# Patient Record
Sex: FEMALE | Race: WHITE | NOT HISPANIC OR LATINO | Employment: OTHER | URBAN - METROPOLITAN AREA
[De-identification: names, ages, dates, MRNs, and addresses within clinical notes are randomized per-mention and may not be internally consistent; named-entity substitution may affect disease eponyms.]

---

## 2017-07-22 ENCOUNTER — HOSPITAL ENCOUNTER (EMERGENCY)
Facility: HOSPITAL | Age: 27
Discharge: HOME | End: 2017-07-23
Attending: EMERGENCY MEDICINE | Admitting: EMERGENCY MEDICINE

## 2017-07-22 DIAGNOSIS — K59.00 CONSTIPATION, UNSPECIFIED CONSTIPATION TYPE: ICD-10-CM

## 2017-07-22 DIAGNOSIS — R10.30 LOWER ABDOMINAL PAIN: Primary | ICD-10-CM

## 2017-07-22 PROCEDURE — 99284 EMERGENCY DEPT VISIT MOD MDM: CPT

## 2017-07-22 RX ORDER — ACETAMINOPHEN AND CODEINE PHOSPHATE 120; 12 MG/5ML; MG/5ML
1 SOLUTION ORAL DAILY
COMMUNITY
End: 2022-09-26

## 2017-07-22 RX ORDER — PROCHLORPERAZINE MALEATE 10 MG
10 TABLET ORAL AS NEEDED
COMMUNITY
End: 2022-09-26

## 2017-07-22 RX ORDER — SODIUM CHLORIDE 0.9 % (FLUSH) 0.9 %
10 SYRINGE (ML) INJECTION AS NEEDED
Status: DISCONTINUED | OUTPATIENT
Start: 2017-07-22 | End: 2017-07-23 | Stop reason: HOSPADM

## 2017-07-22 RX ORDER — METOPROLOL TARTRATE 50 MG/1
50 TABLET, FILM COATED ORAL 2 TIMES DAILY
COMMUNITY

## 2017-07-22 RX ORDER — ONDANSETRON 2 MG/ML
4 INJECTION INTRAMUSCULAR; INTRAVENOUS ONCE
Status: COMPLETED | OUTPATIENT
Start: 2017-07-22 | End: 2017-07-23

## 2017-07-22 RX ORDER — DIVALPROEX SODIUM 250 MG/1
500 TABLET, DELAYED RELEASE ORAL NIGHTLY
COMMUNITY
End: 2022-09-26

## 2017-07-22 RX ORDER — TRAZODONE HYDROCHLORIDE 50 MG/1
50 TABLET ORAL NIGHTLY
COMMUNITY
End: 2019-01-23

## 2017-07-22 RX ORDER — CLONAZEPAM 1 MG/1
2 TABLET ORAL 3 TIMES DAILY PRN
COMMUNITY
End: 2022-09-26

## 2017-07-22 RX ORDER — TOPIRAMATE 50 MG/1
50 TABLET, FILM COATED ORAL 2 TIMES DAILY
COMMUNITY

## 2017-07-22 RX ORDER — DULOXETIN HYDROCHLORIDE 60 MG/1
120 CAPSULE, DELAYED RELEASE ORAL DAILY
COMMUNITY
End: 2019-01-23

## 2017-07-22 RX ORDER — MORPHINE SULFATE 4 MG/ML
4 INJECTION, SOLUTION INTRAMUSCULAR; INTRAVENOUS ONCE
Status: COMPLETED | OUTPATIENT
Start: 2017-07-22 | End: 2017-07-23

## 2017-07-22 RX ORDER — GABAPENTIN 600 MG/1
600 TABLET ORAL 2 TIMES DAILY
COMMUNITY
End: 2019-02-26

## 2017-07-22 RX ORDER — CYCLOBENZAPRINE HCL 10 MG
10 TABLET ORAL NIGHTLY
COMMUNITY
End: 2019-01-23

## 2017-07-22 RX ORDER — TIZANIDINE 4 MG/1
4 TABLET ORAL EVERY 8 HOURS PRN
COMMUNITY
End: 2019-01-23

## 2017-07-22 RX ORDER — MELOXICAM 15 MG/1
15 TABLET ORAL DAILY
COMMUNITY
End: 2022-09-26 | Stop reason: ALTCHOICE

## 2017-07-23 ENCOUNTER — APPOINTMENT (OUTPATIENT)
Dept: CT IMAGING | Facility: HOSPITAL | Age: 27
End: 2017-07-23

## 2017-07-23 VITALS
SYSTOLIC BLOOD PRESSURE: 102 MMHG | HEIGHT: 70 IN | BODY MASS INDEX: 30.06 KG/M2 | RESPIRATION RATE: 18 BRPM | WEIGHT: 210 LBS | DIASTOLIC BLOOD PRESSURE: 64 MMHG | HEART RATE: 66 BPM | OXYGEN SATURATION: 98 % | TEMPERATURE: 98.3 F

## 2017-07-23 LAB
ALBUMIN SERPL-MCNC: 4.2 G/DL (ref 3.2–4.8)
ALBUMIN/GLOB SERPL: 1.9 G/DL (ref 1.5–2.5)
ALP SERPL-CCNC: 52 U/L (ref 25–100)
ALT SERPL W P-5'-P-CCNC: 13 U/L (ref 7–40)
AMPHET+METHAMPHET UR QL: NEGATIVE
AMPHETAMINES UR QL: NEGATIVE
ANION GAP SERPL CALCULATED.3IONS-SCNC: 5 MMOL/L (ref 3–11)
AST SERPL-CCNC: 18 U/L (ref 0–33)
B-HCG UR QL: NEGATIVE
B-OH-BUTYR SERPL-SCNC: <0.18 MMOL/L
BARBITURATES UR QL SCN: NEGATIVE
BASOPHILS # BLD AUTO: 0.05 10*3/MM3 (ref 0–0.2)
BASOPHILS NFR BLD AUTO: 0.7 % (ref 0–1)
BENZODIAZ UR QL SCN: NEGATIVE
BILIRUB SERPL-MCNC: 0.1 MG/DL (ref 0.3–1.2)
BILIRUB UR QL STRIP: NEGATIVE
BUN BLD-MCNC: 12 MG/DL (ref 9–23)
BUN/CREAT SERPL: 13.3 (ref 7–25)
BUPRENORPHINE SERPL-MCNC: NEGATIVE NG/ML
CALCIUM SPEC-SCNC: 9.4 MG/DL (ref 8.7–10.4)
CANNABINOIDS SERPL QL: NEGATIVE
CHLORIDE SERPL-SCNC: 106 MMOL/L (ref 99–109)
CLARITY UR: CLEAR
CLUE CELLS SPEC QL WET PREP: ABNORMAL
CO2 SERPL-SCNC: 26 MMOL/L (ref 20–31)
COCAINE UR QL: NEGATIVE
COLOR UR: YELLOW
CREAT BLD-MCNC: 0.9 MG/DL (ref 0.6–1.3)
DEPRECATED RDW RBC AUTO: 46.4 FL (ref 37–54)
EOSINOPHIL # BLD AUTO: 0.46 10*3/MM3 (ref 0–0.3)
EOSINOPHIL NFR BLD AUTO: 6.1 % (ref 0–3)
ERYTHROCYTE [DISTWIDTH] IN BLOOD BY AUTOMATED COUNT: 13.3 % (ref 11.3–14.5)
GFR SERPL CREATININE-BSD FRML MDRD: 75 ML/MIN/1.73
GLOBULIN UR ELPH-MCNC: 2.2 GM/DL
GLUCOSE BLD-MCNC: 200 MG/DL (ref 70–100)
GLUCOSE UR STRIP-MCNC: ABNORMAL MG/DL
HCT VFR BLD AUTO: 41 % (ref 34.5–44)
HGB BLD-MCNC: 12.6 G/DL (ref 11.5–15.5)
HGB UR QL STRIP.AUTO: NEGATIVE
HYDATID CYST SPEC WET PREP: ABNORMAL
IMM GRANULOCYTES # BLD: 0.02 10*3/MM3 (ref 0–0.03)
IMM GRANULOCYTES NFR BLD: 0.3 % (ref 0–0.6)
KETONES UR QL STRIP: NEGATIVE
KOH PREP NAIL: NORMAL
LEUKOCYTE ESTERASE UR QL STRIP.AUTO: NEGATIVE
LIPASE SERPL-CCNC: 31 U/L (ref 6–51)
LYMPHOCYTES # BLD AUTO: 2.83 10*3/MM3 (ref 0.6–4.8)
LYMPHOCYTES NFR BLD AUTO: 37.3 % (ref 24–44)
MCH RBC QN AUTO: 29 PG (ref 27–31)
MCHC RBC AUTO-ENTMCNC: 30.7 G/DL (ref 32–36)
MCV RBC AUTO: 94.5 FL (ref 80–99)
METHADONE UR QL SCN: NEGATIVE
MONOCYTES # BLD AUTO: 0.51 10*3/MM3 (ref 0–1)
MONOCYTES NFR BLD AUTO: 6.7 % (ref 0–12)
NEUTROPHILS # BLD AUTO: 3.72 10*3/MM3 (ref 1.5–8.3)
NEUTROPHILS NFR BLD AUTO: 48.9 % (ref 41–71)
NITRITE UR QL STRIP: NEGATIVE
OPIATES UR QL: NEGATIVE
OXYCODONE UR QL SCN: NEGATIVE
PCP UR QL SCN: NEGATIVE
PH UR STRIP.AUTO: 6.5 [PH] (ref 5–8)
PLATELET # BLD AUTO: 165 10*3/MM3 (ref 150–450)
PMV BLD AUTO: 11.6 FL (ref 6–12)
POTASSIUM BLD-SCNC: 4.2 MMOL/L (ref 3.5–5.5)
PROPOXYPH UR QL: NEGATIVE
PROT SERPL-MCNC: 6.4 G/DL (ref 5.7–8.2)
PROT UR QL STRIP: NEGATIVE
RBC # BLD AUTO: 4.34 10*6/MM3 (ref 3.89–5.14)
SODIUM BLD-SCNC: 137 MMOL/L (ref 132–146)
SP GR UR STRIP: 1.01 (ref 1–1.03)
T VAGINALIS SPEC QL WET PREP: ABNORMAL
TRICYCLICS UR QL SCN: NEGATIVE
UROBILINOGEN UR QL STRIP: ABNORMAL
WBC NRBC COR # BLD: 7.59 10*3/MM3 (ref 3.5–10.8)
WBC SPEC QL WET PREP: ABNORMAL
YEAST GENITAL QL WET PREP: ABNORMAL

## 2017-07-23 PROCEDURE — 81003 URINALYSIS AUTO W/O SCOPE: CPT | Performed by: NURSE PRACTITIONER

## 2017-07-23 PROCEDURE — 96375 TX/PRO/DX INJ NEW DRUG ADDON: CPT

## 2017-07-23 PROCEDURE — 0 IOPAMIDOL 61 % SOLUTION: Performed by: EMERGENCY MEDICINE

## 2017-07-23 PROCEDURE — 80053 COMPREHEN METABOLIC PANEL: CPT | Performed by: NURSE PRACTITIONER

## 2017-07-23 PROCEDURE — 74177 CT ABD & PELVIS W/CONTRAST: CPT

## 2017-07-23 PROCEDURE — 83690 ASSAY OF LIPASE: CPT | Performed by: EMERGENCY MEDICINE

## 2017-07-23 PROCEDURE — 85025 COMPLETE CBC W/AUTO DIFF WBC: CPT | Performed by: NURSE PRACTITIONER

## 2017-07-23 PROCEDURE — 87210 SMEAR WET MOUNT SALINE/INK: CPT | Performed by: NURSE PRACTITIONER

## 2017-07-23 PROCEDURE — 96361 HYDRATE IV INFUSION ADD-ON: CPT

## 2017-07-23 PROCEDURE — 25010000002 DIPHENHYDRAMINE PER 50 MG: Performed by: NURSE PRACTITIONER

## 2017-07-23 PROCEDURE — 80306 DRUG TEST PRSMV INSTRMNT: CPT | Performed by: EMERGENCY MEDICINE

## 2017-07-23 PROCEDURE — 25010000002 KETOROLAC TROMETHAMINE PER 15 MG: Performed by: NURSE PRACTITIONER

## 2017-07-23 PROCEDURE — 96374 THER/PROPH/DIAG INJ IV PUSH: CPT

## 2017-07-23 PROCEDURE — 82010 KETONE BODYS QUAN: CPT | Performed by: NURSE PRACTITIONER

## 2017-07-23 PROCEDURE — 25010000002 MORPHINE PER 10 MG: Performed by: EMERGENCY MEDICINE

## 2017-07-23 PROCEDURE — 87220 TISSUE EXAM FOR FUNGI: CPT | Performed by: NURSE PRACTITIONER

## 2017-07-23 PROCEDURE — 87491 CHLMYD TRACH DNA AMP PROBE: CPT | Performed by: NURSE PRACTITIONER

## 2017-07-23 PROCEDURE — 25010000002 ONDANSETRON PER 1 MG: Performed by: EMERGENCY MEDICINE

## 2017-07-23 PROCEDURE — 87591 N.GONORRHOEAE DNA AMP PROB: CPT | Performed by: NURSE PRACTITIONER

## 2017-07-23 PROCEDURE — 81025 URINE PREGNANCY TEST: CPT | Performed by: NURSE PRACTITIONER

## 2017-07-23 PROCEDURE — 96376 TX/PRO/DX INJ SAME DRUG ADON: CPT

## 2017-07-23 RX ORDER — DIPHENHYDRAMINE HYDROCHLORIDE 50 MG/ML
25 INJECTION INTRAMUSCULAR; INTRAVENOUS ONCE
Status: COMPLETED | OUTPATIENT
Start: 2017-07-23 | End: 2017-07-23

## 2017-07-23 RX ORDER — KETOROLAC TROMETHAMINE 30 MG/ML
30 INJECTION, SOLUTION INTRAMUSCULAR; INTRAVENOUS ONCE
Status: COMPLETED | OUTPATIENT
Start: 2017-07-23 | End: 2017-07-23

## 2017-07-23 RX ORDER — MORPHINE SULFATE 4 MG/ML
4 INJECTION, SOLUTION INTRAMUSCULAR; INTRAVENOUS ONCE
Status: COMPLETED | OUTPATIENT
Start: 2017-07-23 | End: 2017-07-23

## 2017-07-23 RX ORDER — POLYETHYLENE GLYCOL 3350 17 G/17G
17 POWDER, FOR SOLUTION ORAL DAILY PRN
Qty: 225 G | Refills: 0 | Status: SHIPPED | OUTPATIENT
Start: 2017-07-23 | End: 2022-09-26 | Stop reason: ALTCHOICE

## 2017-07-23 RX ADMIN — SODIUM CHLORIDE 1000 ML: 9 INJECTION, SOLUTION INTRAVENOUS at 01:00

## 2017-07-23 RX ADMIN — MORPHINE SULFATE 4 MG: 4 INJECTION, SOLUTION INTRAMUSCULAR; INTRAVENOUS at 02:30

## 2017-07-23 RX ADMIN — MORPHINE SULFATE 4 MG: 4 INJECTION, SOLUTION INTRAMUSCULAR; INTRAVENOUS at 01:01

## 2017-07-23 RX ADMIN — ONDANSETRON 4 MG: 2 INJECTION INTRAMUSCULAR; INTRAVENOUS at 01:00

## 2017-07-23 RX ADMIN — KETOROLAC TROMETHAMINE 30 MG: 30 INJECTION, SOLUTION INTRAMUSCULAR at 03:37

## 2017-07-23 RX ADMIN — IOPAMIDOL 75 ML: 612 INJECTION, SOLUTION INTRAVENOUS at 01:51

## 2017-07-23 RX ADMIN — DIPHENHYDRAMINE HYDROCHLORIDE 25 MG: 50 INJECTION INTRAMUSCULAR; INTRAVENOUS at 03:37

## 2017-07-23 NOTE — ED PROVIDER NOTES
Subjective   HPI Comments: Ms. Marcie Black is a 27 y.o. female who presents to the ED with c/o pelvic pain. She reports that she has been having severe pelvic pain for the last month. She states that around a kaiser ago she began having incontinence every 2-3 nights and shortly after that is when she developed pelvic pain. She notes that the pelvic pain radiates into her lower back and upper thigh. She also complains of nausea, and a decreased appetite. She denies vomiting, diarrhea, and vaginal discharge. She also reports that her last OBGYN recently retired so she is in the process of looking for a new one but saw a PA about her problem who scheduled a CT in 2 days. However, she states that she can't wait until then to have the CT. She also reports that she was told by the PA that she may have ovarian cysts and was started on BC pills but they did not do an ultrasound. She has a hx of diabetes and removal of a large cyst. She states that her period ended 3 days ago and she is not sexually active. No other acute complaints at this time.    Patient is a 27 y.o. female presenting with pain.   History provided by:  Patient  Pain   Location:  Pelvis  Severity:  Severe  Onset quality:  Gradual  Duration:  1 month  Timing:  Constant  Progression:  Unchanged  Chronicity:  New  Associated symptoms: nausea    Associated symptoms: no diarrhea and no vomiting        Review of Systems   Constitutional: Positive for appetite change (Decreased appetite).   Gastrointestinal: Positive for nausea. Negative for diarrhea and vomiting.   Genitourinary: Negative for vaginal discharge.   Musculoskeletal: Positive for back pain.   All other systems reviewed and are negative.      Past Medical History:   Diagnosis Date   • Anxiety    • Depression    • Diabetes mellitus    • Gastroparesis    • Migraine    • PTSD (post-traumatic stress disorder)    • Rapid palpitations        Allergies   Allergen Reactions   • Sulfa Antibiotics Nausea And  Vomiting   • Amoxicillin Rash   • Penicillins Rash       Past Surgical History:   Procedure Laterality Date   • HYMENECTOMY     • OVARIAN CYST REMOVAL         History reviewed. No pertinent family history.    Social History     Social History   • Marital status: Single     Spouse name: N/A   • Number of children: N/A   • Years of education: N/A     Social History Main Topics   • Smoking status: Never Smoker   • Smokeless tobacco: None   • Alcohol use No   • Drug use: No   • Sexual activity: Yes     Other Topics Concern   • None     Social History Narrative   • None         Objective   Physical Exam   Constitutional: She is oriented to person, place, and time. She appears well-developed and well-nourished. She appears distressed (unable to get comfortable).   HENT:   Head: Normocephalic and atraumatic.   Right Ear: External ear normal.   Left Ear: External ear normal.   Eyes: EOM are normal. Pupils are equal, round, and reactive to light.   Neck: Normal range of motion. Neck supple.   Cardiovascular: Normal rate, regular rhythm and normal heart sounds.    Pulmonary/Chest: Effort normal and breath sounds normal. No respiratory distress. She has no wheezes.   Abdominal: Soft. Bowel sounds are normal. She exhibits no distension. There is tenderness (bilateral lower abdominal tenderness).   Genitourinary: There is no rash, tenderness or lesion on the right labia. There is no rash, tenderness or lesion on the left labia. Cervix exhibits discharge (clear discharge). Cervix exhibits no motion tenderness. Right adnexum displays no mass, no tenderness and no fullness. Left adnexum displays no mass, no tenderness and no fullness. There is bleeding (mild bleeding) in the vagina. No erythema or tenderness in the vagina.   Musculoskeletal: Normal range of motion. She exhibits no edema, tenderness or deformity.   Neurological: She is alert and oriented to person, place, and time. No cranial nerve deficit.   Skin: Skin is warm and  dry. She is not diaphoretic. No erythema. No pallor.   Psychiatric: She has a normal mood and affect.   Nursing note and vitals reviewed.      Procedures         ED Course  ED Course   Comment By Time   Pt is advised of results at this time. Pt informs that she has to give herself enemas every 3 days.  Pt reports pain relief. Pt will be dc home. Pt to f/u with pcp as discussed. Pt agrees and verb understanding. Coco Benitez, APRN 07/23 0407       Recent Results (from the past 24 hour(s))   Comprehensive Metabolic Panel    Collection Time: 07/23/17 12:38 AM   Result Value Ref Range    Glucose 200 (H) 70 - 100 mg/dL    BUN 12 9 - 23 mg/dL    Creatinine 0.90 0.60 - 1.30 mg/dL    Sodium 137 132 - 146 mmol/L    Potassium 4.2 3.5 - 5.5 mmol/L    Chloride 106 99 - 109 mmol/L    CO2 26.0 20.0 - 31.0 mmol/L    Calcium 9.4 8.7 - 10.4 mg/dL    Total Protein 6.4 5.7 - 8.2 g/dL    Albumin 4.20 3.20 - 4.80 g/dL    ALT (SGPT) 13 7 - 40 U/L    AST (SGOT) 18 0 - 33 U/L    Alkaline Phosphatase 52 25 - 100 U/L    Total Bilirubin 0.1 (L) 0.3 - 1.2 mg/dL    eGFR Non African Amer 75 >60 mL/min/1.73    Globulin 2.2 gm/dL    A/G Ratio 1.9 1.5 - 2.5 g/dL    BUN/Creatinine Ratio 13.3 7.0 - 25.0    Anion Gap 5.0 3.0 - 11.0 mmol/L   CBC Auto Differential    Collection Time: 07/23/17 12:38 AM   Result Value Ref Range    WBC 7.59 3.50 - 10.80 10*3/mm3    RBC 4.34 3.89 - 5.14 10*6/mm3    Hemoglobin 12.6 11.5 - 15.5 g/dL    Hematocrit 41.0 34.5 - 44.0 %    MCV 94.5 80.0 - 99.0 fL    MCH 29.0 27.0 - 31.0 pg    MCHC 30.7 (L) 32.0 - 36.0 g/dL    RDW 13.3 11.3 - 14.5 %    RDW-SD 46.4 37.0 - 54.0 fl    MPV 11.6 6.0 - 12.0 fL    Platelets 165 150 - 450 10*3/mm3    Neutrophil % 48.9 41.0 - 71.0 %    Lymphocyte % 37.3 24.0 - 44.0 %    Monocyte % 6.7 0.0 - 12.0 %    Eosinophil % 6.1 (H) 0.0 - 3.0 %    Basophil % 0.7 0.0 - 1.0 %    Immature Grans % 0.3 0.0 - 0.6 %    Neutrophils, Absolute 3.72 1.50 - 8.30 10*3/mm3    Lymphocytes, Absolute 2.83 0.60 -  4.80 10*3/mm3    Monocytes, Absolute 0.51 0.00 - 1.00 10*3/mm3    Eosinophils, Absolute 0.46 (H) 0.00 - 0.30 10*3/mm3    Basophils, Absolute 0.05 0.00 - 0.20 10*3/mm3    Immature Grans, Absolute 0.02 0.00 - 0.03 10*3/mm3   Lipase    Collection Time: 07/23/17 12:38 AM   Result Value Ref Range    Lipase 31 6 - 51 U/L   Beta Hydroxybutyrate Quantitative    Collection Time: 07/23/17 12:38 AM   Result Value Ref Range    Beta-Hydroxybutyrate Quant <0.180 <=0.500 mmol/L   Pregnancy, Urine    Collection Time: 07/23/17  1:01 AM   Result Value Ref Range    HCG, Urine QL Negative Negative   Urinalysis With / Culture If Indicated    Collection Time: 07/23/17  1:01 AM   Result Value Ref Range    Color, UA Yellow Yellow, Straw    Appearance, UA Clear Clear    pH, UA 6.5 5.0 - 8.0    Specific Gravity, UA 1.010 1.001 - 1.030    Glucose,  mg/dL (Trace) (A) Negative    Ketones, UA Negative Negative    Bilirubin, UA Negative Negative    Blood, UA Negative Negative    Protein, UA Negative Negative    Leuk Esterase, UA Negative Negative    Nitrite, UA Negative Negative    Urobilinogen, UA 0.2 E.U./dL 0.2 - 1.0 E.U./dL   Urine Drug Screen    Collection Time: 07/23/17  1:01 AM   Result Value Ref Range    THC, Screen, Urine Negative Negative    Phencyclidine (PCP), Urine Negative Negative    Cocaine Screen, Urine Negative Negative    Methamphetamine, Urine Negative Negative    Opiate Screen Negative Negative    Amphetamine Screen, Urine Negative Negative    Benzodiazepine Screen, Urine Negative Negative    Tricyclic Antidepressants Screen Negative Negative    Methadone Screen, Urine Negative Negative    Barbiturates Screen, Urine Negative Negative    Oxycodone Screen, Urine Negative Negative    Propoxyphene Screen Negative Negative    Buprenorphine, Screen, Urine Negative Negative     Note: In addition to lab results from this visit, the labs listed above may include labs taken at another facility or during a different encounter  "within the last 24 hours. Please correlate lab times with ED admission and discharge times for further clarification of the services performed during this visit.    CT Abdomen Pelvis With Contrast   Final Result   Abnormal      1.  Moderate amount of stool throughout the colon, without obstruction.        2.  Incidental/non-acute findings are described above.      THIS DOCUMENT HAS BEEN ELECTRONICALLY SIGNED BY MAHAMED BUCKLEY MD        Vitals:    07/22/17 2308 07/23/17 0230 07/23/17 0315 07/23/17 0316   BP: 110/64 114/76 124/63    BP Location:  Right arm     Patient Position:  Lying     Pulse: 72 65 65    Resp: 18 16 18    Temp: 98.2 °F (36.8 °C)      SpO2: 97% 99%  99%   Weight: 210 lb (95.3 kg)      Height: 70\" (177.8 cm)        Medications   sodium chloride 0.9 % flush 10 mL (not administered)   sodium chloride 0.9 % bolus 1,000 mL (0 mL Intravenous Stopped 7/23/17 0340)   ondansetron (ZOFRAN) injection 4 mg (4 mg Intravenous Given 7/23/17 0100)   Morphine sulfate (PF) injection 4 mg (4 mg Intravenous Given 7/23/17 0101)   iopamidol (ISOVUE-300) 61 % injection 100 mL (75 mL Intravenous Given 7/23/17 0151)   Morphine sulfate (PF) injection 4 mg (4 mg Intravenous Given 7/23/17 0230)   diphenhydrAMINE (BENADRYL) injection 25 mg (25 mg Intravenous Given 7/23/17 0337)   ketorolac (TORADOL) injection 30 mg (30 mg Intravenous Given 7/23/17 0337)     ECG/EMG Results (last 24 hours)     ** No results found for the last 24 hours. **                      MDM    Final diagnoses:   Lower abdominal pain   Constipation, unspecified constipation type       Documentation assistance provided by mita Mathis.  Information recorded by the scribe was done at my direction and has been verified and validated by me.     Venita Mathis  07/22/17 9442       NATHALIA Donnelly  07/23/17 3353    "

## 2017-07-24 LAB
C TRACH RRNA SPEC DONR QL NAA+PROBE: NEGATIVE
N GONORRHOEA DNA SPEC QL NAA+PROBE: NEGATIVE

## 2018-01-14 ENCOUNTER — APPOINTMENT (OUTPATIENT)
Dept: CT IMAGING | Facility: HOSPITAL | Age: 28
End: 2018-01-14

## 2018-01-14 ENCOUNTER — HOSPITAL ENCOUNTER (EMERGENCY)
Facility: HOSPITAL | Age: 28
Discharge: HOME | End: 2018-01-14
Attending: EMERGENCY MEDICINE | Admitting: EMERGENCY MEDICINE

## 2018-01-14 VITALS
BODY MASS INDEX: 29.2 KG/M2 | OXYGEN SATURATION: 99 % | HEIGHT: 70 IN | SYSTOLIC BLOOD PRESSURE: 110 MMHG | RESPIRATION RATE: 16 BRPM | DIASTOLIC BLOOD PRESSURE: 77 MMHG | WEIGHT: 204 LBS | HEART RATE: 67 BPM | TEMPERATURE: 98.1 F

## 2018-01-14 DIAGNOSIS — G43.701 CHRONIC MIGRAINE WITHOUT AURA WITH STATUS MIGRAINOSUS, NOT INTRACTABLE: Primary | ICD-10-CM

## 2018-01-14 LAB
ALBUMIN SERPL-MCNC: 3.9 G/DL (ref 3.2–4.8)
ALBUMIN/GLOB SERPL: 1.8 G/DL (ref 1.5–2.5)
ALP SERPL-CCNC: 51 U/L (ref 25–100)
ALT SERPL W P-5'-P-CCNC: 16 U/L (ref 7–40)
AMPHET+METHAMPHET UR QL: NEGATIVE
AMPHETAMINES UR QL: NEGATIVE
ANION GAP SERPL CALCULATED.3IONS-SCNC: 9 MMOL/L (ref 3–11)
AST SERPL-CCNC: 12 U/L (ref 0–33)
B-HCG UR QL: NEGATIVE
BARBITURATES UR QL SCN: POSITIVE
BASOPHILS # BLD AUTO: 0.07 10*3/MM3 (ref 0–0.2)
BASOPHILS NFR BLD AUTO: 1 % (ref 0–1)
BENZODIAZ UR QL SCN: NEGATIVE
BILIRUB SERPL-MCNC: 0.2 MG/DL (ref 0.3–1.2)
BILIRUB UR QL STRIP: NEGATIVE
BUN BLD-MCNC: 12 MG/DL (ref 9–23)
BUN/CREAT SERPL: 12 (ref 7–25)
BUPRENORPHINE SERPL-MCNC: NEGATIVE NG/ML
CALCIUM SPEC-SCNC: 8.7 MG/DL (ref 8.7–10.4)
CANNABINOIDS SERPL QL: NEGATIVE
CHLORIDE SERPL-SCNC: 109 MMOL/L (ref 99–109)
CLARITY UR: CLEAR
CO2 SERPL-SCNC: 23 MMOL/L (ref 20–31)
COCAINE UR QL: NEGATIVE
COLOR UR: YELLOW
CREAT BLD-MCNC: 1 MG/DL (ref 0.6–1.3)
DEPRECATED RDW RBC AUTO: 49.2 FL (ref 37–54)
EOSINOPHIL # BLD AUTO: 0.36 10*3/MM3 (ref 0–0.3)
EOSINOPHIL NFR BLD AUTO: 5 % (ref 0–3)
ERYTHROCYTE [DISTWIDTH] IN BLOOD BY AUTOMATED COUNT: 13.9 % (ref 11.3–14.5)
ETHANOL BLD-MCNC: <10 MG/DL (ref 0–10)
GFR SERPL CREATININE-BSD FRML MDRD: 67 ML/MIN/1.73
GLOBULIN UR ELPH-MCNC: 2.2 GM/DL
GLUCOSE BLD-MCNC: 113 MG/DL (ref 70–100)
GLUCOSE BLDC GLUCOMTR-MCNC: 112 MG/DL (ref 70–130)
GLUCOSE BLDC GLUCOMTR-MCNC: 55 MG/DL (ref 70–130)
GLUCOSE UR STRIP-MCNC: NEGATIVE MG/DL
HCT VFR BLD AUTO: 40.4 % (ref 34.5–44)
HGB BLD-MCNC: 12.4 G/DL (ref 11.5–15.5)
HGB UR QL STRIP.AUTO: NEGATIVE
IMM GRANULOCYTES # BLD: 0.01 10*3/MM3 (ref 0–0.03)
IMM GRANULOCYTES NFR BLD: 0.1 % (ref 0–0.6)
INTERNAL NEGATIVE CONTROL: REACTIVE
INTERNAL POSITIVE CONTROL: REACTIVE
KETONES UR QL STRIP: NEGATIVE
LEUKOCYTE ESTERASE UR QL STRIP.AUTO: NEGATIVE
LYMPHOCYTES # BLD AUTO: 2.35 10*3/MM3 (ref 0.6–4.8)
LYMPHOCYTES NFR BLD AUTO: 32.8 % (ref 24–44)
Lab: NORMAL
MCH RBC QN AUTO: 29.6 PG (ref 27–31)
MCHC RBC AUTO-ENTMCNC: 30.7 G/DL (ref 32–36)
MCV RBC AUTO: 96.4 FL (ref 80–99)
METHADONE UR QL SCN: NEGATIVE
MONOCYTES # BLD AUTO: 0.54 10*3/MM3 (ref 0–1)
MONOCYTES NFR BLD AUTO: 7.5 % (ref 0–12)
NEUTROPHILS # BLD AUTO: 3.83 10*3/MM3 (ref 1.5–8.3)
NEUTROPHILS NFR BLD AUTO: 53.6 % (ref 41–71)
NITRITE UR QL STRIP: NEGATIVE
OPIATES UR QL: NEGATIVE
OXYCODONE UR QL SCN: NEGATIVE
PCP UR QL SCN: NEGATIVE
PH UR STRIP.AUTO: 7.5 [PH] (ref 5–8)
PLATELET # BLD AUTO: 193 10*3/MM3 (ref 150–450)
PMV BLD AUTO: 12.7 FL (ref 6–12)
POTASSIUM BLD-SCNC: 3.9 MMOL/L (ref 3.5–5.5)
PROPOXYPH UR QL: NEGATIVE
PROT SERPL-MCNC: 6.1 G/DL (ref 5.7–8.2)
PROT UR QL STRIP: NEGATIVE
RBC # BLD AUTO: 4.19 10*6/MM3 (ref 3.89–5.14)
SODIUM BLD-SCNC: 141 MMOL/L (ref 132–146)
SP GR UR STRIP: 1.01 (ref 1–1.03)
TRICYCLICS UR QL SCN: NEGATIVE
UROBILINOGEN UR QL STRIP: NORMAL
VALPROATE SERPL-MCNC: <1 MCG/ML (ref 50–150)
WBC NRBC COR # BLD: 7.16 10*3/MM3 (ref 3.5–10.8)

## 2018-01-14 PROCEDURE — 25010000002 METOCLOPRAMIDE PER 10 MG: Performed by: PHYSICIAN ASSISTANT

## 2018-01-14 PROCEDURE — 81003 URINALYSIS AUTO W/O SCOPE: CPT | Performed by: PHYSICIAN ASSISTANT

## 2018-01-14 PROCEDURE — 99284 EMERGENCY DEPT VISIT MOD MDM: CPT

## 2018-01-14 PROCEDURE — 25010000002 DIPHENHYDRAMINE PER 50 MG: Performed by: PHYSICIAN ASSISTANT

## 2018-01-14 PROCEDURE — 80164 ASSAY DIPROPYLACETIC ACD TOT: CPT | Performed by: PHYSICIAN ASSISTANT

## 2018-01-14 PROCEDURE — 25010000002 KETOROLAC TROMETHAMINE PER 15 MG: Performed by: PHYSICIAN ASSISTANT

## 2018-01-14 PROCEDURE — 96375 TX/PRO/DX INJ NEW DRUG ADDON: CPT

## 2018-01-14 PROCEDURE — 25010000002 HYDROMORPHONE PER 4 MG: Performed by: EMERGENCY MEDICINE

## 2018-01-14 PROCEDURE — 80053 COMPREHEN METABOLIC PANEL: CPT | Performed by: PHYSICIAN ASSISTANT

## 2018-01-14 PROCEDURE — 85025 COMPLETE CBC W/AUTO DIFF WBC: CPT | Performed by: PHYSICIAN ASSISTANT

## 2018-01-14 PROCEDURE — 25010000002 LORAZEPAM PER 2 MG: Performed by: EMERGENCY MEDICINE

## 2018-01-14 PROCEDURE — 96374 THER/PROPH/DIAG INJ IV PUSH: CPT

## 2018-01-14 PROCEDURE — 80306 DRUG TEST PRSMV INSTRMNT: CPT | Performed by: PHYSICIAN ASSISTANT

## 2018-01-14 PROCEDURE — 80307 DRUG TEST PRSMV CHEM ANLYZR: CPT | Performed by: PHYSICIAN ASSISTANT

## 2018-01-14 PROCEDURE — 82962 GLUCOSE BLOOD TEST: CPT

## 2018-01-14 PROCEDURE — 96361 HYDRATE IV INFUSION ADD-ON: CPT

## 2018-01-14 PROCEDURE — 70450 CT HEAD/BRAIN W/O DYE: CPT

## 2018-01-14 PROCEDURE — 25010000002 FENTANYL CITRATE (PF) 100 MCG/2ML SOLUTION: Performed by: EMERGENCY MEDICINE

## 2018-01-14 RX ORDER — SODIUM CHLORIDE 0.9 % (FLUSH) 0.9 %
10 SYRINGE (ML) INJECTION AS NEEDED
Status: DISCONTINUED | OUTPATIENT
Start: 2018-01-14 | End: 2018-01-14 | Stop reason: HOSPADM

## 2018-01-14 RX ORDER — LORAZEPAM 2 MG/ML
1 INJECTION INTRAMUSCULAR ONCE
Status: COMPLETED | OUTPATIENT
Start: 2018-01-14 | End: 2018-01-14

## 2018-01-14 RX ORDER — DIPHENHYDRAMINE HYDROCHLORIDE 50 MG/ML
25 INJECTION INTRAMUSCULAR; INTRAVENOUS ONCE
Status: COMPLETED | OUTPATIENT
Start: 2018-01-14 | End: 2018-01-14

## 2018-01-14 RX ORDER — CETIRIZINE HYDROCHLORIDE 10 MG/1
10 TABLET ORAL DAILY
COMMUNITY
End: 2022-09-26

## 2018-01-14 RX ORDER — FENTANYL CITRATE 50 UG/ML
50 INJECTION, SOLUTION INTRAMUSCULAR; INTRAVENOUS ONCE
Status: COMPLETED | OUTPATIENT
Start: 2018-01-14 | End: 2018-01-14

## 2018-01-14 RX ORDER — METOCLOPRAMIDE HYDROCHLORIDE 5 MG/ML
10 INJECTION INTRAMUSCULAR; INTRAVENOUS ONCE
Status: COMPLETED | OUTPATIENT
Start: 2018-01-14 | End: 2018-01-14

## 2018-01-14 RX ORDER — HYDROMORPHONE HYDROCHLORIDE 1 MG/ML
1 INJECTION, SOLUTION INTRAMUSCULAR; INTRAVENOUS; SUBCUTANEOUS ONCE
Status: COMPLETED | OUTPATIENT
Start: 2018-01-14 | End: 2018-01-14

## 2018-01-14 RX ORDER — KETOROLAC TROMETHAMINE 30 MG/ML
15 INJECTION, SOLUTION INTRAMUSCULAR; INTRAVENOUS ONCE
Status: COMPLETED | OUTPATIENT
Start: 2018-01-14 | End: 2018-01-14

## 2018-01-14 RX ADMIN — LORAZEPAM 1 MG: 2 INJECTION, SOLUTION INTRAMUSCULAR; INTRAVENOUS at 12:38

## 2018-01-14 RX ADMIN — DIPHENHYDRAMINE HYDROCHLORIDE 25 MG: 50 INJECTION INTRAMUSCULAR; INTRAVENOUS at 12:35

## 2018-01-14 RX ADMIN — KETOROLAC TROMETHAMINE 15 MG: 30 INJECTION, SOLUTION INTRAMUSCULAR at 12:37

## 2018-01-14 RX ADMIN — SODIUM CHLORIDE 1000 ML: 9 INJECTION, SOLUTION INTRAVENOUS at 12:40

## 2018-01-14 RX ADMIN — FENTANYL CITRATE 50 MCG: 50 INJECTION INTRAMUSCULAR; INTRAVENOUS at 13:37

## 2018-01-14 RX ADMIN — METOCLOPRAMIDE 10 MG: 5 INJECTION, SOLUTION INTRAMUSCULAR; INTRAVENOUS at 12:40

## 2018-01-14 RX ADMIN — HYDROMORPHONE HYDROCHLORIDE 1 MG: 2 INJECTION INTRAMUSCULAR; INTRAVENOUS; SUBCUTANEOUS at 15:31

## 2018-01-14 NOTE — DISCHARGE INSTRUCTIONS
Continue current meds.  Follow up with your PCP on Tuesday.  Call Dr. Haddad's office to schedule a follow up appointment.

## 2018-01-14 NOTE — ED PROVIDER NOTES
Subjective   HPI Comments: 27-year-old female presents to the emergency department with complaints of incapacitating headaches for the past 2 weeks with associated nausea and vomiting.  The patient states that she has a history of chronic recurring headaches since age 15.  She states that she has been evaluated numerous times over the years for her headaches with no good explanation other than related to PTSD and anxiety.  The patient states that this time her headache is different than usual.  Her pain is behind the eyes and in the frontal region.  She has been having some double vision.  She has had no associated fever.  She states that she vomits 6-8 times per day for the past week.  No diarrhea.  No abdominal pain.  The patient states that she has been having a drink or 2 every night at bedtime to try to help her sleep but she wakes up during the night with recurring headaches, nausea and vomiting.  The patient has a history of type 1 diabetes and states that her blood sugar has been running high.  She also has a history of fibromyalgia.  She is a nonsmoker.  She denies any drug use.  Her PCP is Behzad Martin in [facility].    Patient is a 27 y.o. female presenting with headaches.   History provided by:  Patient  Headache   Pain location:  Frontal  Quality:  Sharp and stabbing  Radiates to:  Does not radiate  Pain severity now: severe.  Pain scale at highest: severe.  Onset quality:  Unable to specify  Duration: chronic but worse in past 2 wks.  Progression:  Waxing and waning  Chronicity:  Recurrent  Similar to prior headaches: no    Relieved by:  Nothing  Worsened by:  Nothing  Ineffective treatments:  Prescription medications  Associated symptoms: fatigue, myalgias (secondary to fibromyalgia), nausea and vomiting    Associated symptoms: no abdominal pain, no back pain, no congestion, no cough, no diarrhea, no dizziness, no ear pain, no eye pain, no facial pain, no fever, no focal weakness, no neck  pain, no neck stiffness, no numbness, no sore throat and no weakness        Review of Systems   Constitutional: Positive for appetite change and fatigue. Negative for chills and fever.   HENT: Negative for congestion, ear pain, nosebleeds, rhinorrhea and sore throat.    Eyes: Positive for visual disturbance (double vision at times). Negative for pain and discharge.   Respiratory: Negative for cough, shortness of breath and wheezing.    Cardiovascular: Negative for chest pain, palpitations and leg swelling.   Gastrointestinal: Positive for nausea and vomiting. Negative for abdominal pain, blood in stool and diarrhea.   Endocrine: Negative.    Genitourinary: Negative for dysuria, hematuria and urgency.   Musculoskeletal: Positive for myalgias (secondary to fibromyalgia). Negative for arthralgias, back pain, neck pain and neck stiffness.   Skin: Negative for pallor and rash.   Allergic/Immunologic: Negative for immunocompromised state.   Neurological: Positive for headaches. Negative for dizziness, focal weakness, speech difficulty, weakness and numbness.   Hematological: Negative for adenopathy. Does not bruise/bleed easily.   Psychiatric/Behavioral: Negative.  Negative for confusion.        Hx of severe anxiety and PTSD       Past Medical History:   Diagnosis Date   • Anxiety    • Depression    • Diabetes mellitus    • Gastroparesis    • Migraine    • PTSD (post-traumatic stress disorder)    • Rapid palpitations        Allergies   Allergen Reactions   • Sulfa Antibiotics Nausea And Vomiting   • Amoxicillin Rash   • Penicillins Rash       Past Surgical History:   Procedure Laterality Date   • HYMENECTOMY     • OVARIAN CYST REMOVAL         History reviewed. No pertinent family history.    Social History     Social History   • Marital status: Single     Spouse name: N/A   • Number of children: N/A   • Years of education: N/A     Social History Main Topics   • Smoking status: Never Smoker   • Smokeless tobacco: None   •  Alcohol use No   • Drug use: No   • Sexual activity: Yes     Other Topics Concern   • None     Social History Narrative           Objective   Physical Exam   Constitutional: She is oriented to person, place, and time. She appears well-developed and well-nourished.   Anxious, writhing around, appears uncomfortable     HENT:   Head: Normocephalic and atraumatic.   Right Ear: External ear normal.   Left Ear: External ear normal.   Nose: Nose normal.   Mouth/Throat: Oropharynx is clear and moist.   No sinus tenderness     Eyes: Conjunctivae and EOM are normal. Pupils are equal, round, and reactive to light. Left eye exhibits no discharge. No scleral icterus.   Neck: Normal range of motion. Neck supple.   No meningeal signs     Cardiovascular: Normal rate, regular rhythm, normal heart sounds and intact distal pulses.    No murmur heard.  Pulmonary/Chest: Effort normal and breath sounds normal. No respiratory distress. She has no wheezes. She has no rales. She exhibits no tenderness.   Abdominal: Soft. Bowel sounds are normal. There is no tenderness.   Musculoskeletal: Normal range of motion. She exhibits no edema or tenderness.   Neurological: She is alert and oriented to person, place, and time.   Skin: Skin is warm and dry. No rash noted. She is not diaphoretic.   Psychiatric:   Anxious     Nursing note and vitals reviewed.      Procedures         ED Course  ED Course      4:11 PM  Pt finally better after Dilaudid.  She had no improvement from the migraine cocktail earlier.  Nml CT head.  No concerning labs.  Will d/c home to f/u.  She states she has no neurologist and hasn't seen one in years and would like a referral.   Recent Results (from the past 24 hour(s))   Urinalysis With / Culture If Indicated - Urine, Clean Catch    Collection Time: 01/14/18 12:54 PM   Result Value Ref Range    Color, UA Yellow Yellow, Straw    Appearance, UA Clear Clear    pH, UA 7.5 5.0 - 8.0    Specific Gravity, UA 1.010 1.001 - 1.030     Glucose, UA Negative Negative    Ketones, UA Negative Negative    Bilirubin, UA Negative Negative    Blood, UA Negative Negative    Protein, UA Negative Negative    Leuk Esterase, UA Negative Negative    Nitrite, UA Negative Negative    Urobilinogen, UA 0.2 E.U./dL 0.2 - 1.0 E.U./dL   Urine Drug Screen - Urine, Clean Catch    Collection Time: 01/14/18 12:54 PM   Result Value Ref Range    THC, Screen, Urine Negative Negative    Phencyclidine (PCP), Urine Negative Negative    Cocaine Screen, Urine Negative Negative    Methamphetamine, Urine Negative Negative    Opiate Screen Negative Negative    Amphetamine Screen, Urine Negative Negative    Benzodiazepine Screen, Urine Negative Negative    Tricyclic Antidepressants Screen Negative Negative    Methadone Screen, Urine Negative Negative    Barbiturates Screen, Urine Positive (A) Negative    Oxycodone Screen, Urine Negative Negative    Propoxyphene Screen Negative Negative    Buprenorphine, Screen, Urine Negative Negative   POCT Pregnancy, urine    Collection Time: 01/14/18 12:57 PM   Result Value Ref Range    HCG, Urine, QL Negative Negative    Lot Number [id number]     Internal Positive Control Reactive     Internal Negative Control Reactive    POC Glucose Once    Collection Time: 01/14/18  1:48 PM   Result Value Ref Range    Glucose 55 (L) 70 - 130 mg/dL   Comprehensive Metabolic Panel    Collection Time: 01/14/18  2:21 PM   Result Value Ref Range    Glucose 113 (H) 70 - 100 mg/dL    BUN 12 9 - 23 mg/dL    Creatinine 1.00 0.60 - 1.30 mg/dL    Sodium 141 132 - 146 mmol/L    Potassium 3.9 3.5 - 5.5 mmol/L    Chloride 109 99 - 109 mmol/L    CO2 23.0 20.0 - 31.0 mmol/L    Calcium 8.7 8.7 - 10.4 mg/dL    Total Protein 6.1 5.7 - 8.2 g/dL    Albumin 3.90 3.20 - 4.80 g/dL    ALT (SGPT) 16 7 - 40 U/L    AST (SGOT) 12 0 - 33 U/L    Alkaline Phosphatase 51 25 - 100 U/L    Total Bilirubin 0.2 (L) 0.3 - 1.2 mg/dL    eGFR Non African Amer 67 >60 mL/min/1.73    Globulin 2.2 gm/dL     A/G Ratio 1.8 1.5 - 2.5 g/dL    BUN/Creatinine Ratio 12.0 7.0 - 25.0    Anion Gap 9.0 3.0 - 11.0 mmol/L   Valproic Acid Level, Total    Collection Time: 01/14/18  2:21 PM   Result Value Ref Range    Valproic Acid <1.0 (L) 50.0 - 150.0 mcg/mL   CBC Auto Differential    Collection Time: 01/14/18  2:21 PM   Result Value Ref Range    WBC 7.16 3.50 - 10.80 10*3/mm3    RBC 4.19 3.89 - 5.14 10*6/mm3    Hemoglobin 12.4 11.5 - 15.5 g/dL    Hematocrit 40.4 34.5 - 44.0 %    MCV 96.4 80.0 - 99.0 fL    MCH 29.6 27.0 - 31.0 pg    MCHC 30.7 (L) 32.0 - 36.0 g/dL    RDW 13.9 11.3 - 14.5 %    RDW-SD 49.2 37.0 - 54.0 fl    MPV 12.7 (H) 6.0 - 12.0 fL    Platelets 193 150 - 450 10*3/mm3    Neutrophil % 53.6 41.0 - 71.0 %    Lymphocyte % 32.8 24.0 - 44.0 %    Monocyte % 7.5 0.0 - 12.0 %    Eosinophil % 5.0 (H) 0.0 - 3.0 %    Basophil % 1.0 0.0 - 1.0 %    Immature Grans % 0.1 0.0 - 0.6 %    Neutrophils, Absolute 3.83 1.50 - 8.30 10*3/mm3    Lymphocytes, Absolute 2.35 0.60 - 4.80 10*3/mm3    Monocytes, Absolute 0.54 0.00 - 1.00 10*3/mm3    Eosinophils, Absolute 0.36 (H) 0.00 - 0.30 10*3/mm3    Basophils, Absolute 0.07 0.00 - 0.20 10*3/mm3    Immature Grans, Absolute 0.01 0.00 - 0.03 10*3/mm3   POC Glucose Once    Collection Time: 01/14/18  2:24 PM   Result Value Ref Range    Glucose 112 70 - 130 mg/dL     Note: In addition to lab results from this visit, the labs listed above may include labs taken at another facility or during a different encounter within the last 24 hours. Please correlate lab times with ED admission and discharge times for further clarification of the services performed during this visit.    CT Head Without Contrast    (Results Pending)     Vitals:    01/14/18 1221 01/14/18 1321 01/14/18 1418 01/14/18 1518   BP:       BP Location:       Patient Position:       Pulse: 73 62 65 79   Resp:       Temp:       TempSrc:       SpO2: 96% 98% 100% 100%   Weight:       Height:         Medications   sodium chloride 0.9 % flush 10  mL (not administered)   sodium chloride 0.9 % bolus 1,000 mL (0 mL Intravenous Stopped 1/14/18 1534)   metoclopramide (REGLAN) injection 10 mg (10 mg Intravenous Given 1/14/18 1240)   ketorolac (TORADOL) injection 15 mg (15 mg Intravenous Given 1/14/18 1237)   diphenhydrAMINE (BENADRYL) injection 25 mg (25 mg Intravenous Given 1/14/18 1235)   LORazepam (ATIVAN) injection 1 mg (1 mg Intravenous Given 1/14/18 1238)   fentaNYL citrate (PF) (SUBLIMAZE) injection 50 mcg (50 mcg Intravenous Given 1/14/18 1337)   HYDROmorphone (DILAUDID) injection 1 mg (1 mg Intravenous Given 1/14/18 1531)     ECG/EMG Results (last 24 hours)     ** No results found for the last 24 hours. **                    [facility]    Final diagnoses:   Chronic migraine without aura with status migrainosus, not intractable            GLENDY Leigh  01/14/18 8580

## 2018-12-22 ENCOUNTER — APPOINTMENT (OUTPATIENT)
Dept: CT IMAGING | Facility: HOSPITAL | Age: 28
End: 2018-12-22

## 2018-12-22 ENCOUNTER — HOSPITAL ENCOUNTER (EMERGENCY)
Facility: HOSPITAL | Age: 28
Discharge: HOME | End: 2018-12-22
Attending: EMERGENCY MEDICINE | Admitting: EMERGENCY MEDICINE

## 2018-12-22 VITALS
SYSTOLIC BLOOD PRESSURE: 94 MMHG | DIASTOLIC BLOOD PRESSURE: 60 MMHG | OXYGEN SATURATION: 95 % | RESPIRATION RATE: 20 BRPM | BODY MASS INDEX: 27.2 KG/M2 | WEIGHT: 190 LBS | HEART RATE: 64 BPM | HEIGHT: 70 IN | TEMPERATURE: 98.5 F

## 2018-12-22 DIAGNOSIS — R53.1 GENERALIZED WEAKNESS: ICD-10-CM

## 2018-12-22 DIAGNOSIS — G43.711 INTRACTABLE CHRONIC MIGRAINE WITHOUT AURA AND WITH STATUS MIGRAINOSUS: Primary | ICD-10-CM

## 2018-12-22 DIAGNOSIS — R53.83 FATIGUE, UNSPECIFIED TYPE: ICD-10-CM

## 2018-12-22 LAB
ALBUMIN SERPL-MCNC: 4.34 G/DL (ref 3.2–4.8)
ALBUMIN/GLOB SERPL: 2.2 G/DL (ref 1.5–2.5)
ALP SERPL-CCNC: 45 U/L (ref 25–100)
ALT SERPL W P-5'-P-CCNC: 14 U/L (ref 7–40)
AMPHET+METHAMPHET UR QL: NEGATIVE
AMPHETAMINES UR QL: NEGATIVE
ANION GAP SERPL CALCULATED.3IONS-SCNC: 8 MMOL/L (ref 3–11)
AST SERPL-CCNC: 15 U/L (ref 0–33)
ATMOSPHERIC PRESS: ABNORMAL MMHG
B-HCG UR QL: NEGATIVE
B-OH-BUTYR SERPL-SCNC: <0.18 MMOL/L
BACTERIA UR QL AUTO: ABNORMAL /HPF
BARBITURATES UR QL SCN: NEGATIVE
BASE EXCESS BLDV CALC-SCNC: -3.9 MMOL/L (ref -2–2)
BASOPHILS # BLD AUTO: 0.02 10*3/MM3 (ref 0–0.2)
BASOPHILS NFR BLD AUTO: 0.4 % (ref 0–1)
BDY SITE: ABNORMAL
BENZODIAZ UR QL SCN: POSITIVE
BILIRUB SERPL-MCNC: 0.2 MG/DL (ref 0.3–1.2)
BILIRUB UR QL STRIP: NEGATIVE
BODY TEMPERATURE: 37 C
BUN BLD-MCNC: 24 MG/DL (ref 9–23)
BUN/CREAT SERPL: 23.5 (ref 7–25)
BUPRENORPHINE SERPL-MCNC: NEGATIVE NG/ML
CALCIUM SPEC-SCNC: 8.2 MG/DL (ref 8.7–10.4)
CANNABINOIDS SERPL QL: NEGATIVE
CHLORIDE SERPL-SCNC: 109 MMOL/L (ref 99–109)
CLARITY UR: CLEAR
CO2 BLDA-SCNC: 24 MMOL/L (ref 23–27)
CO2 SERPL-SCNC: 24 MMOL/L (ref 20–31)
COCAINE UR QL: NEGATIVE
COHGB MFR BLD: 1.2 %
COLOR UR: YELLOW
CREAT BLD-MCNC: 1.02 MG/DL (ref 0.6–1.3)
D-LACTATE SERPL-SCNC: 0.8 MMOL/L (ref 0.5–2)
DEPRECATED RDW RBC AUTO: 48.8 FL (ref 37–54)
EOSINOPHIL # BLD AUTO: 0.35 10*3/MM3 (ref 0–0.3)
EOSINOPHIL NFR BLD AUTO: 7.4 % (ref 0–3)
ERYTHROCYTE [DISTWIDTH] IN BLOOD BY AUTOMATED COUNT: 13.7 % (ref 11.3–14.5)
GFR SERPL CREATININE-BSD FRML MDRD: 65 ML/MIN/1.73
GLOBULIN UR ELPH-MCNC: 2 GM/DL
GLUCOSE BLD-MCNC: 109 MG/DL (ref 70–100)
GLUCOSE BLDC GLUCOMTR-MCNC: 108 MG/DL (ref 70–130)
GLUCOSE BLDC GLUCOMTR-MCNC: 46 MG/DL (ref 70–130)
GLUCOSE BLDC GLUCOMTR-MCNC: 67 MG/DL (ref 70–130)
GLUCOSE UR STRIP-MCNC: NEGATIVE MG/DL
HCO3 BLDV-SCNC: 22.6 MMOL/L (ref 22–28)
HCT VFR BLD AUTO: 43.8 % (ref 34.5–44)
HETEROPH AB SER QL LA: NEGATIVE
HGB BLD-MCNC: 14.1 G/DL (ref 11.5–15.5)
HGB BLDA-MCNC: 14.2 G/DL (ref 14–18)
HGB UR QL STRIP.AUTO: NEGATIVE
HOROWITZ INDEX BLD+IHG-RTO: 21 %
HYALINE CASTS UR QL AUTO: ABNORMAL /LPF
IMM GRANULOCYTES # BLD AUTO: 0.04 10*3/MM3 (ref 0–0.03)
IMM GRANULOCYTES NFR BLD AUTO: 0.9 % (ref 0–0.6)
KETONES UR QL STRIP: ABNORMAL
LEUKOCYTE ESTERASE UR QL STRIP.AUTO: NEGATIVE
LIPASE SERPL-CCNC: 27 U/L (ref 6–51)
LYMPHOCYTES # BLD AUTO: 1.19 10*3/MM3 (ref 0.6–4.8)
LYMPHOCYTES NFR BLD AUTO: 25.3 % (ref 24–44)
Lab: ABNORMAL
MCH RBC QN AUTO: 31.1 PG (ref 27–31)
MCHC RBC AUTO-ENTMCNC: 32.2 G/DL (ref 32–36)
MCV RBC AUTO: 96.7 FL (ref 80–99)
METHADONE UR QL SCN: NEGATIVE
METHGB BLD QL: 0.9 %
MODALITY: ABNORMAL
MONOCYTES # BLD AUTO: 0.36 10*3/MM3 (ref 0–1)
MONOCYTES NFR BLD AUTO: 7.7 % (ref 0–12)
NEUTROPHILS # BLD AUTO: 2.78 10*3/MM3 (ref 1.5–8.3)
NEUTROPHILS NFR BLD AUTO: 59.2 % (ref 41–71)
NITRITE UR QL STRIP: NEGATIVE
NOTE: ABNORMAL
NOTIFIED BY: ABNORMAL
NOTIFIED WHO: ABNORMAL
OPIATES UR QL: NEGATIVE
OXYCODONE UR QL SCN: NEGATIVE
OXYHGB MFR BLDV: 38.1 %
PCO2 BLDV: 45.8 MM HG (ref 41–51)
PCP UR QL SCN: NEGATIVE
PH BLDV: 7.3 PH UNITS
PH UR STRIP.AUTO: 6 [PH] (ref 5–8)
PLATELET # BLD AUTO: 214 10*3/MM3 (ref 150–450)
PMV BLD AUTO: 11.7 FL (ref 6–12)
PO2 BLDV: 23.4 MM HG (ref 27–53)
POTASSIUM BLD-SCNC: 3.8 MMOL/L (ref 3.5–5.5)
PROPOXYPH UR QL: NEGATIVE
PROT SERPL-MCNC: 6.3 G/DL (ref 5.7–8.2)
PROT UR QL STRIP: ABNORMAL
RBC # BLD AUTO: 4.53 10*6/MM3 (ref 3.89–5.14)
RBC # UR: ABNORMAL /HPF
REF LAB TEST METHOD: ABNORMAL
SODIUM BLD-SCNC: 141 MMOL/L (ref 132–146)
SP GR UR STRIP: 1.03 (ref 1–1.03)
SQUAMOUS #/AREA URNS HPF: ABNORMAL /HPF
T4 FREE SERPL-MCNC: 1.16 NG/DL (ref 0.89–1.76)
TRICYCLICS UR QL SCN: NEGATIVE
TROPONIN I SERPL-MCNC: 0 NG/ML (ref 0–0.07)
TSH SERPL DL<=0.05 MIU/L-ACNC: 0.42 MIU/ML (ref 0.35–5.35)
UROBILINOGEN UR QL STRIP: ABNORMAL
VALPROATE SERPL-MCNC: 38 MCG/ML (ref 50–150)
VENTILATOR MODE: ABNORMAL
WBC NRBC COR # BLD: 4.7 10*3/MM3 (ref 3.5–10.8)
WBC UR QL AUTO: ABNORMAL /HPF

## 2018-12-22 PROCEDURE — 81025 URINE PREGNANCY TEST: CPT | Performed by: PHYSICIAN ASSISTANT

## 2018-12-22 PROCEDURE — 84443 ASSAY THYROID STIM HORMONE: CPT | Performed by: PHYSICIAN ASSISTANT

## 2018-12-22 PROCEDURE — 84484 ASSAY OF TROPONIN QUANT: CPT

## 2018-12-22 PROCEDURE — 99284 EMERGENCY DEPT VISIT MOD MDM: CPT

## 2018-12-22 PROCEDURE — 83605 ASSAY OF LACTIC ACID: CPT | Performed by: PHYSICIAN ASSISTANT

## 2018-12-22 PROCEDURE — 96374 THER/PROPH/DIAG INJ IV PUSH: CPT

## 2018-12-22 PROCEDURE — 84439 ASSAY OF FREE THYROXINE: CPT | Performed by: PHYSICIAN ASSISTANT

## 2018-12-22 PROCEDURE — 93005 ELECTROCARDIOGRAM TRACING: CPT | Performed by: EMERGENCY MEDICINE

## 2018-12-22 PROCEDURE — 70450 CT HEAD/BRAIN W/O DYE: CPT

## 2018-12-22 PROCEDURE — 80053 COMPREHEN METABOLIC PANEL: CPT | Performed by: PHYSICIAN ASSISTANT

## 2018-12-22 PROCEDURE — 85025 COMPLETE CBC W/AUTO DIFF WBC: CPT | Performed by: PHYSICIAN ASSISTANT

## 2018-12-22 PROCEDURE — 96375 TX/PRO/DX INJ NEW DRUG ADDON: CPT

## 2018-12-22 PROCEDURE — 80164 ASSAY DIPROPYLACETIC ACD TOT: CPT | Performed by: PHYSICIAN ASSISTANT

## 2018-12-22 PROCEDURE — 80306 DRUG TEST PRSMV INSTRMNT: CPT | Performed by: PHYSICIAN ASSISTANT

## 2018-12-22 PROCEDURE — 82805 BLOOD GASES W/O2 SATURATION: CPT

## 2018-12-22 PROCEDURE — 82820 HEMOGLOBIN-OXYGEN AFFINITY: CPT

## 2018-12-22 PROCEDURE — 25010000002 PROCHLORPERAZINE EDISYLATE PER 10 MG: Performed by: PHYSICIAN ASSISTANT

## 2018-12-22 PROCEDURE — 96376 TX/PRO/DX INJ SAME DRUG ADON: CPT

## 2018-12-22 PROCEDURE — 83690 ASSAY OF LIPASE: CPT | Performed by: PHYSICIAN ASSISTANT

## 2018-12-22 PROCEDURE — 63710000001 DIPHENHYDRAMINE PER 50 MG: Performed by: PHYSICIAN ASSISTANT

## 2018-12-22 PROCEDURE — 86308 HETEROPHILE ANTIBODY SCREEN: CPT | Performed by: PHYSICIAN ASSISTANT

## 2018-12-22 PROCEDURE — 82010 KETONE BODYS QUAN: CPT | Performed by: PHYSICIAN ASSISTANT

## 2018-12-22 PROCEDURE — 81001 URINALYSIS AUTO W/SCOPE: CPT | Performed by: PHYSICIAN ASSISTANT

## 2018-12-22 PROCEDURE — 25010000002 ONDANSETRON PER 1 MG: Performed by: PHYSICIAN ASSISTANT

## 2018-12-22 PROCEDURE — 96361 HYDRATE IV INFUSION ADD-ON: CPT

## 2018-12-22 PROCEDURE — 82962 GLUCOSE BLOOD TEST: CPT

## 2018-12-22 RX ORDER — ONDANSETRON 2 MG/ML
4 INJECTION INTRAMUSCULAR; INTRAVENOUS ONCE
Status: COMPLETED | OUTPATIENT
Start: 2018-12-22 | End: 2018-12-22

## 2018-12-22 RX ORDER — DEXTROSE MONOHYDRATE 25 G/50ML
12.5 INJECTION, SOLUTION INTRAVENOUS ONCE
Status: COMPLETED | OUTPATIENT
Start: 2018-12-22 | End: 2018-12-22

## 2018-12-22 RX ORDER — ONDANSETRON 4 MG/1
4 TABLET, ORALLY DISINTEGRATING ORAL EVERY 8 HOURS PRN
Qty: 14 TABLET | Refills: 0 | Status: SHIPPED | OUTPATIENT
Start: 2018-12-22

## 2018-12-22 RX ORDER — DEXTROSE MONOHYDRATE 25 G/50ML
INJECTION, SOLUTION INTRAVENOUS
Status: DISCONTINUED
Start: 2018-12-22 | End: 2018-12-22 | Stop reason: HOSPADM

## 2018-12-22 RX ORDER — DEXTROSE MONOHYDRATE 25 G/50ML
25 INJECTION, SOLUTION INTRAVENOUS ONCE
Status: COMPLETED | OUTPATIENT
Start: 2018-12-22 | End: 2018-12-22

## 2018-12-22 RX ORDER — DIPHENHYDRAMINE HCL 25 MG
25 CAPSULE ORAL ONCE
Status: COMPLETED | OUTPATIENT
Start: 2018-12-22 | End: 2018-12-22

## 2018-12-22 RX ADMIN — DIPHENHYDRAMINE HYDROCHLORIDE 25 MG: 25 CAPSULE ORAL at 14:36

## 2018-12-22 RX ADMIN — ONDANSETRON 4 MG: 2 INJECTION INTRAMUSCULAR; INTRAVENOUS at 18:03

## 2018-12-22 RX ADMIN — DEXTROSE MONOHYDRATE 12.5 G: 25 INJECTION, SOLUTION INTRAVENOUS at 18:31

## 2018-12-22 RX ADMIN — PROCHLORPERAZINE EDISYLATE 10 MG: 5 INJECTION INTRAMUSCULAR; INTRAVENOUS at 14:36

## 2018-12-22 RX ADMIN — DEXTROSE MONOHYDRATE 12.5 G: 25 INJECTION, SOLUTION INTRAVENOUS at 19:53

## 2018-12-22 RX ADMIN — SODIUM CHLORIDE 1000 ML: 9 INJECTION, SOLUTION INTRAVENOUS at 19:14

## 2018-12-22 RX ADMIN — SODIUM CHLORIDE 1000 ML: 9 INJECTION, SOLUTION INTRAVENOUS at 14:35

## 2018-12-22 NOTE — ED PROVIDER NOTES
Subjective   Marcie Black is a 28 y.o. female who presents to the emergency department with complaints of headache that started 3 weeks ago that has increased since onset.  She reports generalized head pressure.  Mother reports that the patient has an increase in fatigue and generalized weakness and states that the patient has been laying on the couch for the last 3 weeks. The patient has nausea and vomiting that started 2 days ago, and has felt off balance as she feels generally weak causing her to stumble while walkingfor the last week. The patient has a PMHx of chronic migraines, and reports that it has been years since she last seen a neurologist or had a head CT. She has a MRI scheduled this week to look for possible pressure on her pituitary gland as her OB/GYN noted hyperprolactinemia and elevated testosterone level. She has a PMHx of T1DM, anxiety, and fibromyalgia. She denies any tobacco, drug, or alcohol use. She denies any diarrhea, constipation, fever, dysuria, hematuria, and any other acute symptoms at this time.         History provided by:  Patient  Headache   Pain location:  Generalized  Quality: pressure.  Radiates to:  Does not radiate  Onset quality:  Sudden  Duration:  3 weeks  Timing:  Constant  Progression:  Worsening  Chronicity:  Recurrent  Relieved by:  Nothing  Worsened by:  Nothing  Ineffective treatments:  None tried  Associated symptoms: dizziness, fatigue, nausea, vomiting and weakness    Associated symptoms: no diarrhea and no fever        Review of Systems   Constitutional: Positive for fatigue. Negative for chills and fever.   Gastrointestinal: Positive for nausea and vomiting. Negative for constipation and diarrhea.   Genitourinary: Negative for dysuria and hematuria.   Skin: Negative for color change, rash and wound.   Neurological: Positive for dizziness, weakness and headaches.   All other systems reviewed and are negative.      Past Medical History:   Diagnosis Date   • Anxiety     • Depression    • Diabetes mellitus (CMS/HCC)    • Gastroparesis    • Migraine    • PTSD (post-traumatic stress disorder)    • Rapid palpitations        Allergies   Allergen Reactions   • Dhea [Nutritional Supplements] Tinnitus   • Sulfa Antibiotics Nausea And Vomiting   • Amoxicillin Rash   • Penicillins Rash       Past Surgical History:   Procedure Laterality Date   • HYMENECTOMY     • OVARIAN CYST REMOVAL         No family history on file.    Social History     Socioeconomic History   • Marital status: Single     Spouse name: Not on file   • Number of children: Not on file   • Years of education: Not on file   • Highest education level: Not on file   Tobacco Use   • Smoking status: Never Smoker   Substance and Sexual Activity   • Alcohol use: No   • Drug use: No   • Sexual activity: Yes         Objective   Physical Exam   Constitutional: She is oriented to person, place, and time. She appears well-developed and well-nourished. No distress.   HENT:   Head: Normocephalic and atraumatic.   Right Ear: External ear normal.   Left Ear: External ear normal.   Nose: Nose normal.   Mouth/Throat: Oropharynx is clear and moist. No oropharyngeal exudate.   Eyes: Conjunctivae and EOM are normal. Pupils are equal, round, and reactive to light. No scleral icterus.   Dilated pupils   Neck: Normal range of motion. Neck supple.   Cardiovascular: Normal rate, regular rhythm and normal heart sounds. Exam reveals no gallop and no friction rub.   No murmur heard.  Pulmonary/Chest: Effort normal and breath sounds normal. No respiratory distress. She has no wheezes. She has no rales.   Abdominal: Soft. There is no guarding.   Mild upper abdominal tenderness   Musculoskeletal: Normal range of motion.   Neurological: She is alert and oriented to person, place, and time. She displays normal reflexes. No cranial nerve deficit or sensory deficit. She exhibits normal muscle tone. Coordination normal.   Skin: Skin is warm and dry. She is not  diaphoretic.   Psychiatric: She has a normal mood and affect. Her behavior is normal.   Nursing note and vitals reviewed.      Procedures         ED Course  ED Course as of Dec 22 1954   Sat Dec 22, 2018   1442 EKG at 1345 shows normal sinus rhythm rate of 70  [WT]   1537 Upon re-evaluation the patient reports no improvement of headache.   -WT  [CN]   1727 Chloride: 109 [WT]   1951 WBC, UA: (!) 3-5 [WT]   1951 Leuk Esterase, UA: Negative [WT]   1951 Ketones, UA: (!) Trace [WT]   1951 Benzodiazepine Screen, Urine: (!) Positive [WT]   1951 Free T4: 1.16 [WT]   1952 TSH Baseline: 0.423 [WT]   1952 Lactate: 0.8 [WT]   1952 Beta-Hydroxybutyrate Quant: <0.180 [WT]   1952 BUN: (!) 24 [WT]   1952 Creatinine: 1.02 [WT]   1952 WBC: 4.70 [WT]   1952 Hemoglobin: 14.1 [WT]   1952 Hematocrit: 43.8 [WT]   1952 CT brainNo acute intracranial abnormality.   [WT]      ED Course User Index  [CN] Marquita Patel  [WT] Kajal Vigil, GUS     Recent Results (from the past 24 hour(s))   Comprehensive Metabolic Panel    Collection Time: 12/22/18  2:32 PM   Result Value Ref Range    Glucose 109 (H) 70 - 100 mg/dL    BUN 24 (H) 9 - 23 mg/dL    Creatinine 1.02 0.60 - 1.30 mg/dL    Sodium 141 132 - 146 mmol/L    Potassium 3.8 3.5 - 5.5 mmol/L    Chloride 109 99 - 109 mmol/L    CO2 24.0 20.0 - 31.0 mmol/L    Calcium 8.2 (L) 8.7 - 10.4 mg/dL    Total Protein 6.3 5.7 - 8.2 g/dL    Albumin 4.34 3.20 - 4.80 g/dL    ALT (SGPT) 14 7 - 40 U/L    AST (SGOT) 15 0 - 33 U/L    Alkaline Phosphatase 45 25 - 100 U/L    Total Bilirubin 0.2 (L) 0.3 - 1.2 mg/dL    eGFR Non African Amer 65 >60 mL/min/1.73    Globulin 2.0 gm/dL    A/G Ratio 2.2 1.5 - 2.5 g/dL    BUN/Creatinine Ratio 23.5 7.0 - 25.0    Anion Gap 8.0 3.0 - 11.0 mmol/L   Lipase    Collection Time: 12/22/18  2:32 PM   Result Value Ref Range    Lipase 27 6 - 51 U/L   Mononucleosis Screen    Collection Time: 12/22/18  2:32 PM   Result Value Ref Range    Monospot Negative Negative   Lactic Acid,  Plasma    Collection Time: 12/22/18  2:32 PM   Result Value Ref Range    Lactate 0.8 0.5 - 2.0 mmol/L   Valproic Acid Level, Total    Collection Time: 12/22/18  2:32 PM   Result Value Ref Range    Valproic Acid 38.0 (L) 50.0 - 150.0 mcg/mL   CBC Auto Differential    Collection Time: 12/22/18  2:32 PM   Result Value Ref Range    WBC 4.70 3.50 - 10.80 10*3/mm3    RBC 4.53 3.89 - 5.14 10*6/mm3    Hemoglobin 14.1 11.5 - 15.5 g/dL    Hematocrit 43.8 34.5 - 44.0 %    MCV 96.7 80.0 - 99.0 fL    MCH 31.1 (H) 27.0 - 31.0 pg    MCHC 32.2 32.0 - 36.0 g/dL    RDW 13.7 11.3 - 14.5 %    RDW-SD 48.8 37.0 - 54.0 fl    MPV 11.7 6.0 - 12.0 fL    Platelets 214 150 - 450 10*3/mm3    Neutrophil % 59.2 41.0 - 71.0 %    Lymphocyte % 25.3 24.0 - 44.0 %    Monocyte % 7.7 0.0 - 12.0 %    Eosinophil % 7.4 (H) 0.0 - 3.0 %    Basophil % 0.4 0.0 - 1.0 %    Immature Grans % 0.9 (H) 0.0 - 0.6 %    Neutrophils, Absolute 2.78 1.50 - 8.30 10*3/mm3    Lymphocytes, Absolute 1.19 0.60 - 4.80 10*3/mm3    Monocytes, Absolute 0.36 0.00 - 1.00 10*3/mm3    Eosinophils, Absolute 0.35 (H) 0.00 - 0.30 10*3/mm3    Basophils, Absolute 0.02 0.00 - 0.20 10*3/mm3    Immature Grans, Absolute 0.04 (H) 0.00 - 0.03 10*3/mm3   Beta Hydroxybutyrate Quantitative    Collection Time: 12/22/18  2:32 PM   Result Value Ref Range    Beta-Hydroxybutyrate Quant <0.180 <=0.500 mmol/L   TSH    Collection Time: 12/22/18  2:32 PM   Result Value Ref Range    TSH 0.423 0.350 - 5.350 mIU/mL   T4, Free    Collection Time: 12/22/18  2:32 PM   Result Value Ref Range    Free T4 1.16 0.89 - 1.76 ng/dL   Pregnancy, Urine - Urine, Catheter    Collection Time: 12/22/18  2:39 PM   Result Value Ref Range    HCG, Urine QL Negative Negative   Urinalysis With Microscopic If Indicated (No Culture) - Urine, Catheter    Collection Time: 12/22/18  2:39 PM   Result Value Ref Range    Color, UA Yellow Yellow, Straw    Appearance, UA Clear Clear    pH, UA 6.0 5.0 - 8.0    Specific Gravity, UA 1.029 1.001 -  1.030    Glucose, UA Negative Negative    Ketones, UA Trace (A) Negative    Bilirubin, UA Negative Negative    Blood, UA Negative Negative    Protein, UA 30 mg/dL (1+) (A) Negative    Leuk Esterase, UA Negative Negative    Nitrite, UA Negative Negative    Urobilinogen, UA 1.0 E.U./dL 0.2 - 1.0 E.U./dL   Urine Drug Screen - Urine, Catheter    Collection Time: 12/22/18  2:39 PM   Result Value Ref Range    THC, Screen, Urine Negative Negative    Phencyclidine (PCP), Urine Negative Negative    Cocaine Screen, Urine Negative Negative    Methamphetamine, Urine Negative Negative    Opiate Screen Negative Negative    Amphetamine Screen, Urine Negative Negative    Benzodiazepine Screen, Urine Positive (A) Negative    Tricyclic Antidepressants Screen Negative Negative    Methadone Screen, Urine Negative Negative    Barbiturates Screen, Urine Negative Negative    Oxycodone Screen, Urine Negative Negative    Propoxyphene Screen Negative Negative    Buprenorphine, Screen, Urine Negative Negative   Urinalysis, Microscopic Only - Urine, Catheter    Collection Time: 12/22/18  2:39 PM   Result Value Ref Range    RBC, UA 0-2 None Seen, 0-2 /HPF    WBC, UA 3-5 (A) None Seen, 0-2 /HPF    Bacteria, UA 1+ (A) None Seen, Trace /HPF    Squamous Epithelial Cells, UA 13-20 (A) None Seen, 0-2 /HPF    Hyaline Casts, UA 0-6 0 - 6 /LPF    Methodology Manual Light Microscopy    POC Troponin, Rapid    Collection Time: 12/22/18  2:43 PM   Result Value Ref Range    Troponin I 0.00 0.00 - 0.07 ng/mL   POC Glucose Once    Collection Time: 12/22/18  6:12 PM   Result Value Ref Range    Glucose 46 (C) 70 - 130 mg/dL   POC Glucose Once    Collection Time: 12/22/18  6:39 PM   Result Value Ref Range    Glucose 108 70 - 130 mg/dL   POC Glucose Once    Collection Time: 12/22/18  7:48 PM   Result Value Ref Range    Glucose 67 (L) 70 - 130 mg/dL     Note: In addition to lab results from this visit, the labs listed above may include labs taken at another  facility or during a different encounter within the last 24 hours. Please correlate lab times with ED admission and discharge times for further clarification of the services performed during this visit.    CT Head Without Contrast   Final Result   No acute intracranial abnormality.        DICTATED:   12/22/2018   EDITED/ls :   12/22/2018        This report was finalized on 12/22/2018 5:13 PM by Prince Black.            Vitals:    12/22/18 1803 12/22/18 1823 12/22/18 1845 12/22/18 1900   BP: 111/81 97/57 99/61 96/62   Pulse: 69 63 69 67   Resp:       Temp:       SpO2: 99% 95% 97% 95%   Weight:       Height:         Medications   sodium chloride 0.9 % bolus 1,000 mL (1,000 mL Intravenous New Bag 12/22/18 1914)   dextrose (D50W) 50 % 25 g/ 50mL Intravenous Solution  - ADS Override Pull (not administered)   sodium chloride 0.9 % bolus 1,000 mL (0 mL Intravenous Stopped 12/22/18 1832)   prochlorperazine (COMPAZINE) injection 10 mg (10 mg Intravenous Given 12/22/18 1436)   diphenhydrAMINE (BENADRYL) capsule 25 mg (25 mg Oral Given 12/22/18 1436)   ondansetron (ZOFRAN) injection 4 mg (4 mg Intravenous Given 12/22/18 1803)   dextrose (D50W) 25 g/ 50mL Intravenous Solution 12.5 g (12.5 g Intravenous Given 12/22/18 1831)   dextrose (D50W) 25 g/ 50mL Intravenous Solution 25 g (12.5 g Intravenous Given 12/22/18 1953)     ECG/EMG Results (last 24 hours)     Procedure Component Value Units Date/Time    ECG 12 Lead [[id number]] Collected:  12/22/18 1345     Updated:  12/22/18 1345                        MDM  Number of Diagnoses or Management Options  Fatigue, unspecified type:   Generalized weakness:   Intractable chronic migraine without aura and with status migrainosus:   Diagnosis management comments: Patient presents with headache, vomiting, fatigue.  She doesn't history of chronic headaches which she reports are only controlled by opiates.  She scheduled for an MRI this week to evaluate for possible pituitary tumor.  She  presents to the ED for vomiting which is only developed over the past 2-3 days.  Differential diagnosis includes chronic migraine, DKA, and cranial mass.  Plan to obtain CBC, CMP, lipase, VBG, ketone urinalysis, CT brain.  Will treat headache with Compazine and Benadryl as well as given IV fluids.  Patient reports no improvement in her migraine however is feeling generally better after IV fluids.  She is able to tolerate by mouth after Zofran.  She had a few episodes of hypoglycemia in the ED however this improved after some D50.  She has an unremarkable CBC, CMP, lipase.  No ketones in the blood.  She did have ketones on urinalysis and pH was 7.30.  Blood sugar was minimally elevated.  This does not appear to be DKA.  Her CT brain was negative.  He has no ataxia, no focal neurologic deficit suggesting emergent pathology.  I advised that she follow up for her scheduled MRI on Thursday.  She was given return precautions and follow-up with neurology for her chronic migraines.       Amount and/or Complexity of Data Reviewed  Clinical lab tests: reviewed  Tests in the radiology section of CPT®: reviewed  Tests in the medicine section of CPT®: reviewed        Final diagnoses:   Intractable chronic migraine without aura and with status migrainosus   Fatigue, unspecified type   Generalized weakness       Documentation assistance provided by mita Patel.  Information recorded by the scribe was done at my direction and has been verified and validated by me.     Marquita Patel  12/22/18 1408       Kajal Vigil PA-C  12/22/18 1954

## 2018-12-23 NOTE — DISCHARGE INSTRUCTIONS
You have been seen for migraine, fatigue, generalized weakness.  Please follow up with neurology.  Her scheduled MRI this week.  Return to the ED for any fever, weakness on one side, inability to hold down fluids.

## 2019-01-23 ENCOUNTER — OFFICE VISIT (OUTPATIENT)
Dept: NEUROLOGY | Facility: CLINIC | Age: 29
End: 2019-01-23

## 2019-01-23 VITALS
HEIGHT: 70 IN | SYSTOLIC BLOOD PRESSURE: 102 MMHG | WEIGHT: 195 LBS | BODY MASS INDEX: 27.92 KG/M2 | OXYGEN SATURATION: 97 % | RESPIRATION RATE: 20 BRPM | DIASTOLIC BLOOD PRESSURE: 68 MMHG | HEART RATE: 73 BPM

## 2019-01-23 DIAGNOSIS — G43.C0 PERIODIC HEADACHE SYNDROME, NOT INTRACTABLE: Primary | ICD-10-CM

## 2019-01-23 PROCEDURE — 99204 OFFICE O/P NEW MOD 45 MIN: CPT | Performed by: PSYCHIATRY & NEUROLOGY

## 2019-01-23 RX ORDER — ESZOPICLONE 2 MG/1
2 TABLET, FILM COATED ORAL NIGHTLY
COMMUNITY
End: 2022-09-26

## 2019-01-23 RX ORDER — HYDROCODONE BITARTRATE AND ACETAMINOPHEN 10; 325 MG/1; MG/1
1 TABLET ORAL AS NEEDED
COMMUNITY

## 2019-01-23 NOTE — PROGRESS NOTES
Subjective   Patient ID: Marcie Black is a 28 y.o. female     Chief Complaint   Patient presents with   • Altered Mental Status   • Gait Problem   • Eye Pain        History of Present Illness    28 y.o. female referred by Dr Saravia for migraines.  November developed fatigue, weakness in extremities.  Trouble focusing and following a conversation.  Eyes ached and tingled.  Reported pain in spine.  Difficulty with balance.     Sx improved by 90%.  Mild ache in eyes and mild trouble thinking.      Dr Martin prescribes most medications.      HA are daily.  Constant.  Quality is pressure and dull ache.  Severe intensity.  Increasing intensity.  Preventatives of Depakote and TPM.  Previously did BTX but stopped working.      Hydrocodone 20 per month for HA.   Previously followed by Rheumatology by Dr Pierre in [place].  Reviewed medical records:    Pt c/o HA for 3 weeks.  Quality is pressure.  Assoc sx of N/V.  Pt has h/o T1DM, anxiety, FM.      HCT, my review of films, normal   MRI Brain 12/2718 normal     Past Medical History:   Diagnosis Date   • Anxiety    • Depression    • Diabetes mellitus (CMS/HCC)    • Gastroparesis    • Headache, tension-type    • Migraine    • PTSD (post-traumatic stress disorder)    • Rapid palpitations      History reviewed. No pertinent family history.  Social History     Socioeconomic History   • Marital status: Single     Spouse name: Not on file   • Number of children: Not on file   • Years of education: Not on file   • Highest education level: Not on file   Tobacco Use   • Smoking status: Never Smoker   Substance and Sexual Activity   • Alcohol use: No   • Drug use: No   • Sexual activity: Yes       Review of Systems   Constitutional: Positive for fatigue. Negative for activity change and unexpected weight change.   HENT: Negative for tinnitus and trouble swallowing.    Eyes: Negative for photophobia and visual disturbance.   Respiratory: Negative for apnea, cough and choking.   "  Cardiovascular: Negative for leg swelling.   Gastrointestinal: Negative for nausea and vomiting.   Endocrine: Negative for cold intolerance and heat intolerance.   Genitourinary: Negative for difficulty urinating, frequency, menstrual problem and urgency.   Musculoskeletal: Positive for arthralgias, back pain and myalgias. Negative for gait problem and neck pain.   Skin: Negative for color change and rash.   Allergic/Immunologic: Negative for immunocompromised state.   Neurological: Positive for dizziness, weakness and headaches. Negative for tremors, seizures, syncope, facial asymmetry, speech difficulty, light-headedness and numbness.   Hematological: Negative for adenopathy. Does not bruise/bleed easily.   Psychiatric/Behavioral: Negative for behavioral problems, confusion, decreased concentration, hallucinations and sleep disturbance.       Objective     Vitals:    01/23/19 1022   BP: 102/68   Pulse: 73   Resp: 20   SpO2: 97%   Weight: 88.5 kg (195 lb)   Height: 177.8 cm (70\")       Neurologic Exam     Mental Status   Oriented to person, place, and time.   Registration: recalls 3 of 3 objects. Recall at 5 minutes: recalls 3 of 3 objects. Follows 3 step commands.   Attention: normal. Concentration: normal.   Speech: speech is normal   Level of consciousness: alert  Knowledge: good and consistent with education.   Able to name object. Able to read. Able to repeat. Able to write. Normal comprehension.     Cranial Nerves     CN II   Visual fields full to confrontation.   Visual acuity: normal  Right visual field deficit: none  Left visual field deficit: none     CN III, IV, VI   Pupils are equal, round, and reactive to light.  Extraocular motions are normal.   Right pupil: Shape: regular. Reactivity: brisk. Consensual response: intact.   Left pupil: Shape: regular. Reactivity: brisk. Consensual response: intact.   Nystagmus: none   Diplopia: none  Ophthalmoparesis: none  Upgaze: normal  Downgaze: normal  Conjugate " gaze: present  Vestibulo-ocular reflex: present    CN V   Facial sensation intact.   Right corneal reflex: normal  Left corneal reflex: normal    CN VII   Right facial weakness: none  Left facial weakness: none    CN VIII   Hearing: intact    CN IX, X   Palate: symmetric  Right gag reflex: normal  Left gag reflex: normal    CN XI   Right sternocleidomastoid strength: normal  Left sternocleidomastoid strength: normal    CN XII   Tongue: not atrophic  Fasciculations: absent  Tongue deviation: none    Motor Exam   Muscle bulk: normal  Overall muscle tone: normal  Right arm tone: normal  Left arm tone: normal  Right leg tone: normal  Left leg tone: normal    Strength   Strength 5/5 throughout.     Sensory Exam   Light touch normal.   Vibration normal.   Proprioception normal.   Pinprick normal.     Gait, Coordination, and Reflexes     Gait  Gait: normal    Coordination   Romberg: negative  Finger to nose coordination: normal  Heel to shin coordination: normal  Tandem walking coordination: normal    Tremor   Resting tremor: absent  Intention tremor: absent  Action tremor: absent    Reflexes   Reflexes 2+ except as noted.       Physical Exam   Constitutional: She is oriented to person, place, and time. Vital signs are normal. She appears well-developed and well-nourished.   HENT:   Head: Normocephalic and atraumatic.   Eyes: EOM and lids are normal. Pupils are equal, round, and reactive to light.   Fundoscopic exam:       The right eye shows no exudate, no hemorrhage and no papilledema. The right eye shows venous pulsations.        The left eye shows no exudate, no hemorrhage and no papilledema. The left eye shows venous pulsations.   Neck: Normal range of motion and phonation normal. Normal carotid pulses present. Carotid bruit is not present. No thyroid mass and no thyromegaly present.   Cardiovascular: Normal rate, regular rhythm and normal heart sounds.   Pulmonary/Chest: Effort normal.   Neurological: She is oriented  to person, place, and time. She has normal strength. She has a normal Finger-Nose-Finger Test, a normal Heel to Shin Test, a normal Romberg Test and a normal Tandem Gait Test. Gait normal.   Skin: Skin is warm and dry.   Psychiatric: She has a normal mood and affect. Her speech is normal.   Nursing note and vitals reviewed.      Admission on 12/22/2018, Discharged on 12/22/2018   Component Date Value Ref Range Status   • Glucose 12/22/2018 109* 70 - 100 mg/dL Final   • BUN 12/22/2018 24* 9 - 23 mg/dL Final   • Creatinine 12/22/2018 1.02  0.60 - 1.30 mg/dL Final   • Sodium 12/22/2018 141  132 - 146 mmol/L Final   • Potassium 12/22/2018 3.8  3.5 - 5.5 mmol/L Final   • Chloride 12/22/2018 109  99 - 109 mmol/L Final   • CO2 12/22/2018 24.0  20.0 - 31.0 mmol/L Final   • Calcium 12/22/2018 8.2* 8.7 - 10.4 mg/dL Final   • Total Protein 12/22/2018 6.3  5.7 - 8.2 g/dL Final   • Albumin 12/22/2018 4.34  3.20 - 4.80 g/dL Final   • ALT (SGPT) 12/22/2018 14  7 - 40 U/L Final   • AST (SGOT) 12/22/2018 15  0 - 33 U/L Final   • Alkaline Phosphatase 12/22/2018 45  25 - 100 U/L Final   • Total Bilirubin 12/22/2018 0.2* 0.3 - 1.2 mg/dL Final   • eGFR Non  Amer 12/22/2018 65  >60 mL/min/1.73 Final   • Globulin 12/22/2018 2.0  gm/dL Final   • A/G Ratio 12/22/2018 2.2  1.5 - 2.5 g/dL Final   • BUN/Creatinine Ratio 12/22/2018 23.5  7.0 - 25.0 Final   • Anion Gap 12/22/2018 8.0  3.0 - 11.0 mmol/L Final   • Lipase 12/22/2018 27  6 - 51 U/L Final   • HCG, Urine QL 12/22/2018 Negative  Negative Final   • Color, UA 12/22/2018 Yellow  Yellow, Straw Final   • Appearance, UA 12/22/2018 Clear  Clear Final   • pH, UA 12/22/2018 6.0  5.0 - 8.0 Final   • Specific Gravity, UA 12/22/2018 1.029  1.001 - 1.030 Final   • Glucose, UA 12/22/2018 Negative  Negative Final   • Ketones, UA 12/22/2018 Trace* Negative Final   • Bilirubin, UA 12/22/2018 Negative  Negative Final   • Blood, UA 12/22/2018 Negative  Negative Final   • Protein, UA 12/22/2018 30  mg/dL (1+)* Negative Final   • Leuk Esterase, UA 12/22/2018 Negative  Negative Final   • Nitrite, UA 12/22/2018 Negative  Negative Final   • Urobilinogen, UA 12/22/2018 1.0 E.U./dL  0.2 - 1.0 E.U./dL Final   • Monospot 12/22/2018 Negative  Negative Final   • Lactate 12/22/2018 0.8  0.5 - 2.0 mmol/L Final    Falsely depressed results may occur on samples drawn from patients receiving N-Acetylcysteine (NAC) or Metamizole.   • Valproic Acid 12/22/2018 38.0* 50.0 - 150.0 mcg/mL Final   • WBC 12/22/2018 4.70  3.50 - 10.80 10*3/mm3 Final   • RBC 12/22/2018 4.53  3.89 - 5.14 10*6/mm3 Final   • Hemoglobin 12/22/2018 14.1  11.5 - 15.5 g/dL Final   • Hematocrit 12/22/2018 43.8  34.5 - 44.0 % Final   • MCV 12/22/2018 96.7  80.0 - 99.0 fL Final   • MCH 12/22/2018 31.1* 27.0 - 31.0 pg Final   • MCHC 12/22/2018 32.2  32.0 - 36.0 g/dL Final   • RDW 12/22/2018 13.7  11.3 - 14.5 % Final   • RDW-SD 12/22/2018 48.8  37.0 - 54.0 fl Final   • MPV 12/22/2018 11.7  6.0 - 12.0 fL Final   • Platelets 12/22/2018 214  150 - 450 10*3/mm3 Final   • Neutrophil % 12/22/2018 59.2  41.0 - 71.0 % Final   • Lymphocyte % 12/22/2018 25.3  24.0 - 44.0 % Final   • Monocyte % 12/22/2018 7.7  0.0 - 12.0 % Final   • Eosinophil % 12/22/2018 7.4* 0.0 - 3.0 % Final   • Basophil % 12/22/2018 0.4  0.0 - 1.0 % Final   • Immature Grans % 12/22/2018 0.9* 0.0 - 0.6 % Final   • Neutrophils, Absolute 12/22/2018 2.78  1.50 - 8.30 10*3/mm3 Final   • Lymphocytes, Absolute 12/22/2018 1.19  0.60 - 4.80 10*3/mm3 Final   • Monocytes, Absolute 12/22/2018 0.36  0.00 - 1.00 10*3/mm3 Final   • Eosinophils, Absolute 12/22/2018 0.35* 0.00 - 0.30 10*3/mm3 Final   • Basophils, Absolute 12/22/2018 0.02  0.00 - 0.20 10*3/mm3 Final   • Immature Grans, Absolute 12/22/2018 0.04* 0.00 - 0.03 10*3/mm3 Final   • Beta-Hydroxybutyrate Quant 12/22/2018 <0.180  <=0.500 mmol/L Final   • THC, Screen, Urine 12/22/2018 Negative  Negative Final   • Phencyclidine (PCP), Urine 12/22/2018 Negative   Negative Final   • Cocaine Screen, Urine 12/22/2018 Negative  Negative Final   • Methamphetamine, Urine 12/22/2018 Negative  Negative Final   • Opiate Screen 12/22/2018 Negative  Negative Final   • Amphetamine Screen, Urine 12/22/2018 Negative  Negative Final   • Benzodiazepine Screen, Urine 12/22/2018 Positive* Negative Final   • Tricyclic Antidepressants Screen 12/22/2018 Negative  Negative Final   • Methadone Screen, Urine 12/22/2018 Negative  Negative Final   • Barbiturates Screen, Urine 12/22/2018 Negative  Negative Final   • Oxycodone Screen, Urine 12/22/2018 Negative  Negative Final   • Propoxyphene Screen 12/22/2018 Negative  Negative Final   • Buprenorphine, Screen, Urine 12/22/2018 Negative  Negative Final   • RBC, UA 12/22/2018 0-2  None Seen, 0-2 /HPF Final   • WBC, UA 12/22/2018 3-5* None Seen, 0-2 /HPF Final   • Bacteria, UA 12/22/2018 1+* None Seen, Trace /HPF Final   • Squamous Epithelial Cells, UA 12/22/2018 13-20* None Seen, 0-2 /HPF Final   • Hyaline Casts, UA 12/22/2018 0-6  0 - 6 /LPF Final   • Methodology 12/22/2018 Manual Light Microscopy   Final   • Troponin I 12/22/2018 0.00  0.00 - 0.07 ng/mL Final    Serial Number: [id number]:  [id number]   • Site 12/22/2018 Nurse/Dr Draw   Final   • pH, Venous 12/22/2018 7.302  pH Units Final    84 Value below reference range   • pCO2, Venous 12/22/2018 45.8  41.0 - 51.0 mm Hg Final   • pO2, Venous 12/22/2018 23.4* 27.0 - 53.0 mm Hg Final    85 Value below critical limit   • HCO3, Venous 12/22/2018 22.6  22.0 - 28.0 mmol/L Final   • Base Excess, Venous 12/22/2018 -3.9* -2.0 - 2.0 mmol/L Final   • Hemoglobin, Blood Gas 12/22/2018 14.2  14 - 18 g/dL Final   • Oxyhemoglobin Venous 12/22/2018 38.1  % Final    84 Value below reference range   • Methemoglobin Venous 12/22/2018 0.9  % Final   • Carboxyhemoglobin Venous 12/22/2018 1.2  % Final   • CO2 Content 12/22/2018 24.0  23 - 27 mmol/L Final   • Temperature 12/22/2018 37.0  C Final   • Barometric Pressure  for Blood Gas 12/22/2018   mmHg Final    N/A   • Modality 12/22/2018 Room Air   Final   • FIO2 12/22/2018 21  % Final   • Ventilator Mode 12/22/2018     Final    Meter: [id number]     :  [id number]   • Notified Who 12/22/2018 shaggy becerril rn    Final   • Notified By 12/22/2018 [id number]   Final   • Notified Time 12/22/2018 12/22/2018 15:18   Final   • TSH 12/22/2018 0.423  0.350 - 5.350 mIU/mL Final   • Free T4 12/22/2018 1.16  0.89 - 1.76 ng/dL Final   • Glucose 12/22/2018 46* 70 - 130 mg/dL Final   • Glucose 12/22/2018 108  70 - 130 mg/dL Final   • Glucose 12/22/2018 67* 70 - 130 mg/dL Final         Assessment/Plan     Problem List Items Addressed This Visit        Cardiovascular and Mediastinum    Periodic headache syndrome, not intractable - Primary    Current Assessment & Plan     Headaches are worsening.  Medication changes per orders.     Started Aimovig in office    Given loading dosage 140 mg in office              Relevant Medications    metoprolol tartrate (LOPRESSOR) 50 MG tablet    clonazePAM (KlonoPIN) 1 MG tablet    topiramate (TOPAMAX) 50 MG tablet    gabapentin (NEURONTIN) 600 MG tablet    divalproex (DEPAKOTE) 250 MG DR tablet    meloxicam (MOBIC) 15 MG tablet    HYDROcodone-acetaminophen (NORCO)  MG per tablet             No Follow-up on file.

## 2019-01-23 NOTE — ASSESSMENT & PLAN NOTE
Headaches are worsening.  Medication changes per orders.     Started Aimovig in office    Given loading dosage 140 mg in office

## 2019-02-13 ENCOUNTER — SPECIALTY PHARMACY (OUTPATIENT)
Dept: ONCOLOGY | Facility: HOSPITAL | Age: 29
End: 2019-02-13

## 2019-02-26 ENCOUNTER — OFFICE VISIT (OUTPATIENT)
Dept: NEUROLOGY | Facility: CLINIC | Age: 29
End: 2019-02-26

## 2019-02-26 VITALS
HEIGHT: 70 IN | BODY MASS INDEX: 27.2 KG/M2 | RESPIRATION RATE: 18 BRPM | HEART RATE: 76 BPM | SYSTOLIC BLOOD PRESSURE: 104 MMHG | WEIGHT: 190 LBS | DIASTOLIC BLOOD PRESSURE: 68 MMHG

## 2019-02-26 DIAGNOSIS — G43.C0 PERIODIC HEADACHE SYNDROME, NOT INTRACTABLE: Primary | ICD-10-CM

## 2019-02-26 PROCEDURE — 99213 OFFICE O/P EST LOW 20 MIN: CPT | Performed by: PSYCHIATRY & NEUROLOGY

## 2019-02-26 NOTE — ASSESSMENT & PLAN NOTE
Headaches are worsening.  Medication changes per orders.     Stop Aimovig due to worsening sx.    Stop GBP

## 2019-02-26 NOTE — PROGRESS NOTES
Subjective   Patient ID: Marcie Black is a 28 y.o. female     Chief Complaint   Patient presents with   • Headache        History of Present Illness    28 y.o. female returns in follow up for migraines.  Last visit on 1/23/19 started Aimovig 140 mg q 4 weeks.     HA are daily.  Increased sensitivity to temperature changes.  Increased intensity.  Assoc sx of N/V.  Last Aimovig 2/20/19.        Problem history:    November developed fatigue, weakness in extremities.  Trouble focusing and following a conversation.  Eyes ached and tingled.  Reported pain in spine.  Difficulty with balance.     Sx improved by 90%.  Mild ache in eyes and mild trouble thinking.      Dr Martin prescribes most medications.      HA are daily.  Constant.  Quality is pressure and dull ache.  Severe intensity.  Increasing intensity.  Preventatives of Depakote and TPM.  Previously did BTX but stopped working.      Hydrocodone 20 per month for HA.   Previously followed by Rheumatology by Dr Pierre in [place].  Reviewed medical records:    Pt c/o HA for 3 weeks.  Quality is pressure.  Assoc sx of N/V.  Pt has h/o T1DM, anxiety, FM.      HCT, my review of films, normal   MRI Brain 12/2718 normal     Past Medical History:   Diagnosis Date   • Anxiety    • Depression    • Diabetes mellitus (CMS/HCC)    • Gastroparesis    • Headache, tension-type    • Migraine    • PTSD (post-traumatic stress disorder)    • Rapid palpitations      History reviewed. No pertinent family history.  Social History     Socioeconomic History   • Marital status: Single     Spouse name: Not on file   • Number of children: Not on file   • Years of education: Not on file   • Highest education level: Not on file   Tobacco Use   • Smoking status: Never Smoker   Substance and Sexual Activity   • Alcohol use: No   • Drug use: No   • Sexual activity: Yes       Review of Systems   Constitutional: Positive for fatigue. Negative for activity change and unexpected weight change.   HENT:  "Negative for trouble swallowing.    Eyes: Negative for visual disturbance.   Respiratory: Negative for apnea and choking.    Cardiovascular: Negative for leg swelling.   Endocrine: Negative for cold intolerance and heat intolerance.   Genitourinary: Negative for difficulty urinating, frequency, menstrual problem and urgency.   Musculoskeletal: Positive for arthralgias and myalgias. Negative for gait problem.   Skin: Negative for color change and rash.   Allergic/Immunologic: Negative for immunocompromised state.   Neurological: Negative for tremors, syncope, facial asymmetry, speech difficulty and light-headedness.   Hematological: Negative for adenopathy. Does not bruise/bleed easily.   Psychiatric/Behavioral: Negative for behavioral problems, confusion, decreased concentration, hallucinations and sleep disturbance.       Objective     Vitals:    02/26/19 1059   BP: 104/68   BP Location: Left arm   Patient Position: Sitting   Cuff Size: Adult   Pulse: 76   Resp: 18   Weight: 86.2 kg (190 lb)   Height: 177.8 cm (70\")       Neurologic Exam     Mental Status   Registration: recalls 3 of 3 objects. Recall at 5 minutes: recalls 3 of 3 objects. Follows 3 step commands.   Attention: normal. Concentration: normal.   Level of consciousness: alert  Knowledge: good and consistent with education.   Able to name object. Able to read. Able to repeat. Able to write. Normal comprehension.     Cranial Nerves     CN II   Visual fields full to confrontation.   Visual acuity: normal  Right visual field deficit: none  Left visual field deficit: none     CN III, IV, VI   Right pupil: Shape: regular. Reactivity: brisk. Consensual response: intact.   Left pupil: Shape: regular. Reactivity: brisk. Consensual response: intact.   Nystagmus: none   Diplopia: none  Ophthalmoparesis: none  Upgaze: normal  Downgaze: normal  Conjugate gaze: present  Vestibulo-ocular reflex: present    CN V   Facial sensation intact.   Right corneal reflex: " normal  Left corneal reflex: normal    CN VII   Right facial weakness: none  Left facial weakness: none    CN VIII   Hearing: intact    CN IX, X   Palate: symmetric  Right gag reflex: normal  Left gag reflex: normal    CN XI   Right sternocleidomastoid strength: normal  Left sternocleidomastoid strength: normal    CN XII   Tongue: not atrophic  Fasciculations: absent  Tongue deviation: none    Motor Exam   Muscle bulk: normal  Overall muscle tone: normal  Right arm tone: normal  Left arm tone: normal  Right leg tone: normal  Left leg tone: normal    Sensory Exam   Light touch normal.   Vibration normal.   Proprioception normal.   Pinprick normal.     Gait, Coordination, and Reflexes     Tremor   Resting tremor: absent  Intention tremor: absent  Action tremor: absent    Reflexes   Reflexes 2+ except as noted.       Physical Exam   Constitutional: She appears well-developed and well-nourished.   Nursing note and vitals reviewed.      Admission on 12/22/2018, Discharged on 12/22/2018   Component Date Value Ref Range Status   • Glucose 12/22/2018 109* 70 - 100 mg/dL Final   • BUN 12/22/2018 24* 9 - 23 mg/dL Final   • Creatinine 12/22/2018 1.02  0.60 - 1.30 mg/dL Final   • Sodium 12/22/2018 141  132 - 146 mmol/L Final   • Potassium 12/22/2018 3.8  3.5 - 5.5 mmol/L Final   • Chloride 12/22/2018 109  99 - 109 mmol/L Final   • CO2 12/22/2018 24.0  20.0 - 31.0 mmol/L Final   • Calcium 12/22/2018 8.2* 8.7 - 10.4 mg/dL Final   • Total Protein 12/22/2018 6.3  5.7 - 8.2 g/dL Final   • Albumin 12/22/2018 4.34  3.20 - 4.80 g/dL Final   • ALT (SGPT) 12/22/2018 14  7 - 40 U/L Final   • AST (SGOT) 12/22/2018 15  0 - 33 U/L Final   • Alkaline Phosphatase 12/22/2018 45  25 - 100 U/L Final   • Total Bilirubin 12/22/2018 0.2* 0.3 - 1.2 mg/dL Final   • eGFR Non  Amer 12/22/2018 65  >60 mL/min/1.73 Final   • Globulin 12/22/2018 2.0  gm/dL Final   • A/G Ratio 12/22/2018 2.2  1.5 - 2.5 g/dL Final   • BUN/Creatinine Ratio 12/22/2018  23.5  7.0 - 25.0 Final   • Anion Gap 12/22/2018 8.0  3.0 - 11.0 mmol/L Final   • Lipase 12/22/2018 27  6 - 51 U/L Final   • HCG, Urine QL 12/22/2018 Negative  Negative Final   • Color, UA 12/22/2018 Yellow  Yellow, Straw Final   • Appearance, UA 12/22/2018 Clear  Clear Final   • pH, UA 12/22/2018 6.0  5.0 - 8.0 Final   • Specific Gravity, UA 12/22/2018 1.029  1.001 - 1.030 Final   • Glucose, UA 12/22/2018 Negative  Negative Final   • Ketones, UA 12/22/2018 Trace* Negative Final   • Bilirubin, UA 12/22/2018 Negative  Negative Final   • Blood, UA 12/22/2018 Negative  Negative Final   • Protein, UA 12/22/2018 30 mg/dL (1+)* Negative Final   • Leuk Esterase, UA 12/22/2018 Negative  Negative Final   • Nitrite, UA 12/22/2018 Negative  Negative Final   • Urobilinogen, UA 12/22/2018 1.0 E.U./dL  0.2 - 1.0 E.U./dL Final   • Monospot 12/22/2018 Negative  Negative Final   • Lactate 12/22/2018 0.8  0.5 - 2.0 mmol/L Final    Falsely depressed results may occur on samples drawn from patients receiving N-Acetylcysteine (NAC) or Metamizole.   • Valproic Acid 12/22/2018 38.0* 50.0 - 150.0 mcg/mL Final   • WBC 12/22/2018 4.70  3.50 - 10.80 10*3/mm3 Final   • RBC 12/22/2018 4.53  3.89 - 5.14 10*6/mm3 Final   • Hemoglobin 12/22/2018 14.1  11.5 - 15.5 g/dL Final   • Hematocrit 12/22/2018 43.8  34.5 - 44.0 % Final   • MCV 12/22/2018 96.7  80.0 - 99.0 fL Final   • MCH 12/22/2018 31.1* 27.0 - 31.0 pg Final   • MCHC 12/22/2018 32.2  32.0 - 36.0 g/dL Final   • RDW 12/22/2018 13.7  11.3 - 14.5 % Final   • RDW-SD 12/22/2018 48.8  37.0 - 54.0 fl Final   • MPV 12/22/2018 11.7  6.0 - 12.0 fL Final   • Platelets 12/22/2018 214  150 - 450 10*3/mm3 Final   • Neutrophil % 12/22/2018 59.2  41.0 - 71.0 % Final   • Lymphocyte % 12/22/2018 25.3  24.0 - 44.0 % Final   • Monocyte % 12/22/2018 7.7  0.0 - 12.0 % Final   • Eosinophil % 12/22/2018 7.4* 0.0 - 3.0 % Final   • Basophil % 12/22/2018 0.4  0.0 - 1.0 % Final   • Immature Grans % 12/22/2018 0.9* 0.0 -  0.6 % Final   • Neutrophils, Absolute 12/22/2018 2.78  1.50 - 8.30 10*3/mm3 Final   • Lymphocytes, Absolute 12/22/2018 1.19  0.60 - 4.80 10*3/mm3 Final   • Monocytes, Absolute 12/22/2018 0.36  0.00 - 1.00 10*3/mm3 Final   • Eosinophils, Absolute 12/22/2018 0.35* 0.00 - 0.30 10*3/mm3 Final   • Basophils, Absolute 12/22/2018 0.02  0.00 - 0.20 10*3/mm3 Final   • Immature Grans, Absolute 12/22/2018 0.04* 0.00 - 0.03 10*3/mm3 Final   • Beta-Hydroxybutyrate Quant 12/22/2018 <0.180  <=0.500 mmol/L Final   • THC, Screen, Urine 12/22/2018 Negative  Negative Final   • Phencyclidine (PCP), Urine 12/22/2018 Negative  Negative Final   • Cocaine Screen, Urine 12/22/2018 Negative  Negative Final   • Methamphetamine 12/22/2018 Negative  Negative Final   • Opiate Screen 12/22/2018 Negative  Negative Final   • Amphetamine Screen, Urine 12/22/2018 Negative  Negative Final   • Benzodiazepine Screen, Urine 12/22/2018 Positive* Negative Final   • Tricyclic Antidepressants Screen 12/22/2018 Negative  Negative Final   • Methadone Screen, Urine 12/22/2018 Negative  Negative Final   • Barbiturates Screen, Urine 12/22/2018 Negative  Negative Final   • Oxycodone Screen, Urine 12/22/2018 Negative  Negative Final   • Propoxyphene Screen 12/22/2018 Negative  Negative Final   • Buprenorphine, Screen, Urine 12/22/2018 Negative  Negative Final   • RBC, UA 12/22/2018 0-2  None Seen, 0-2 /HPF Final   • WBC, UA 12/22/2018 3-5* None Seen, 0-2 /HPF Final   • Bacteria, UA 12/22/2018 1+* None Seen, Trace /HPF Final   • Squamous Epithelial Cells, UA 12/22/2018 13-20* None Seen, 0-2 /HPF Final   • Hyaline Casts, UA 12/22/2018 0-6  0 - 6 /LPF Final   • Methodology 12/22/2018 Manual Light Microscopy   Final   • Troponin I 12/22/2018 0.00  0.00 - 0.07 ng/mL Final    Serial Number: [id number]:  [id number]   • Site 12/22/2018 Nurse/ Draw   Final   • pH, Venous 12/22/2018 7.302  pH Units Final    84 Value below reference range   • pCO2, Venous 12/22/2018 45.8   41.0 - 51.0 mm Hg Final   • pO2, Venous 12/22/2018 23.4* 27.0 - 53.0 mm Hg Final    85 Value below critical limit   • HCO3, Venous 12/22/2018 22.6  22.0 - 28.0 mmol/L Final   • Base Excess, Venous 12/22/2018 -3.9* -2.0 - 2.0 mmol/L Final   • Hemoglobin, Blood Gas 12/22/2018 14.2  14 - 18 g/dL Final   • Oxyhemoglobin Venous 12/22/2018 38.1  % Final    84 Value below reference range   • Methemoglobin Venous 12/22/2018 0.9  % Final   • Carboxyhemoglobin Venous 12/22/2018 1.2  % Final   • CO2 Content 12/22/2018 24.0  23 - 27 mmol/L Final   • Temperature 12/22/2018 37.0  C Final   • Barometric Pressure for Blood Gas 12/22/2018   mmHg Final    N/A   • Modality 12/22/2018 Room Air   Final   • FIO2 12/22/2018 21  % Final   • Ventilator Mode 12/22/2018     Final    Meter: [id number]     :  [id number]   • Notified Who 12/22/2018 shaggy becerril rn    Final   • Notified By 12/22/2018 [id number]   Final   • Notified Time 12/22/2018 12/22/2018 15:18   Final   • TSH 12/22/2018 0.423  0.350 - 5.350 mIU/mL Final   • Free T4 12/22/2018 1.16  0.89 - 1.76 ng/dL Final   • Glucose 12/22/2018 46* 70 - 130 mg/dL Final   • Glucose 12/22/2018 108  70 - 130 mg/dL Final   • Glucose 12/22/2018 67* 70 - 130 mg/dL Final         Assessment/Plan     Problem List Items Addressed This Visit        Cardiovascular and Mediastinum    Periodic headache syndrome, not intractable - Primary    Current Assessment & Plan     Headaches are worsening.  Medication changes per orders.     Stop Aimovig due to worsening sx.    Stop GBP              Relevant Medications    metoprolol tartrate (LOPRESSOR) 50 MG tablet    clonazePAM (KlonoPIN) 1 MG tablet    topiramate (TOPAMAX) 50 MG tablet    divalproex (DEPAKOTE) 250 MG DR tablet    meloxicam (MOBIC) 15 MG tablet    HYDROcodone-acetaminophen (NORCO)  MG per tablet             No Follow-up on file.

## 2020-05-15 ENCOUNTER — TRANSCRIBE ORDERS (OUTPATIENT)
Dept: LAB | Facility: HOSPITAL | Age: 30
End: 2020-05-15

## 2020-05-15 ENCOUNTER — LAB (OUTPATIENT)
Dept: LAB | Facility: HOSPITAL | Age: 30
End: 2020-05-15

## 2020-05-15 DIAGNOSIS — Z11.3 SCREENING EXAMINATION FOR VENEREAL DISEASE: Primary | ICD-10-CM

## 2020-05-15 DIAGNOSIS — Z11.3 SCREENING EXAMINATION FOR VENEREAL DISEASE: ICD-10-CM

## 2020-05-15 LAB
HBV SURFACE AG SERPL QL IA: NORMAL
HCV AB SER DONR QL: NORMAL
HIV1+2 AB SER QL: NORMAL

## 2020-05-15 PROCEDURE — G0432 EIA HIV-1/HIV-2 SCREEN: HCPCS

## 2020-05-15 PROCEDURE — 87340 HEPATITIS B SURFACE AG IA: CPT

## 2020-05-15 PROCEDURE — 86592 SYPHILIS TEST NON-TREP QUAL: CPT

## 2020-05-15 PROCEDURE — 86803 HEPATITIS C AB TEST: CPT

## 2020-05-15 PROCEDURE — 86695 HERPES SIMPLEX TYPE 1 TEST: CPT

## 2020-05-15 PROCEDURE — 36415 COLL VENOUS BLD VENIPUNCTURE: CPT

## 2020-05-15 PROCEDURE — 86696 HERPES SIMPLEX TYPE 2 TEST: CPT

## 2020-05-16 LAB
HOLD SPECIMEN: NORMAL
HSV1 IGG SER IA-ACNC: <0.91 INDEX (ref 0–0.9)
HSV2 IGG SER IA-ACNC: <0.91 INDEX (ref 0–0.9)
RPR SER QL: NORMAL

## 2020-12-22 NOTE — TELEPHONE ENCOUNTER
PTS MOTHER CALLED BACK SHE WILL NEED THESE CONTOUR NEXT TEST STRIPS CALLED IN TODAY  PT [facility]    MOTHER # [phone]

## 2021-04-16 ENCOUNTER — OFFICE VISIT (OUTPATIENT)
Dept: ENDOCRINOLOGY | Facility: CLINIC | Age: 31
End: 2021-04-16

## 2021-04-16 VITALS
WEIGHT: 160 LBS | OXYGEN SATURATION: 99 % | HEART RATE: 106 BPM | HEIGHT: 70 IN | BODY MASS INDEX: 22.9 KG/M2 | DIASTOLIC BLOOD PRESSURE: 80 MMHG | TEMPERATURE: 97.3 F | SYSTOLIC BLOOD PRESSURE: 126 MMHG

## 2021-04-16 DIAGNOSIS — E10.65 UNCONTROLLED TYPE 1 DIABETES MELLITUS WITH HYPERGLYCEMIA (HCC): Primary | ICD-10-CM

## 2021-04-16 PROCEDURE — 99214 OFFICE O/P EST MOD 30 MIN: CPT | Performed by: INTERNAL MEDICINE

## 2021-04-16 RX ORDER — LORAZEPAM 2 MG/1
2 TABLET ORAL 3 TIMES DAILY
COMMUNITY
Start: 2021-04-14

## 2021-04-16 RX ORDER — GABAPENTIN 600 MG/1
600 TABLET ORAL 2 TIMES DAILY
COMMUNITY
End: 2023-03-10

## 2021-04-16 RX ORDER — SUBCUTANEOUS INSULIN PUMP
EACH MISCELLANEOUS
COMMUNITY

## 2021-04-16 NOTE — PROGRESS NOTES
"     Office Note      Date: 2021  Patient Name: Marcie Black  MRN: [id number]  : 1990    Chief Complaint   Patient presents with   • Diabetes       History of Present Illness:   Marcie Black is a 30 y.o. female who presents for Diabetes- type 1  I have not seen her since 2019 as she has been in the hospital some  15 times for  Neurologic issues- seizures.  They were difficult to control. She was on keppra and ativan and THC.   She says that since  morning once she started \" rebuking the devil\" she has not had seizures.    bg checks are done \" a lot \"  She says that at the hospital they keep taking her pump and changing the settings.       Last A1c: she does not know and is unsure if one has been done.    Changes in health since last visit: see above . Last eye exam up to date .    Subjective          Review of Systems:   Review of Systems   Neurological: Positive for seizures.       The following portions of the patient's history were reviewed and updated as appropriate: allergies, current medications, past family history, past medical history, past social history, past surgical history and problem list.    Objective     Visit Vitals  /80 (BP Location: Left arm, Patient Position: Sitting, Cuff Size: Adult)   Pulse 106   Temp 97.3 °F (36.3 °C) (Infrared)   Ht 177.8 cm (70\")   Wt 72.6 kg (160 lb)   SpO2 99%   BMI 22.96 kg/m²       Labs:    CMP  Lab Results   Component Value Date    GLUCOSE 109 (H) 2018    BUN 24 (H) 2018    CREATININE 1.02 2018    EGFRIFNONA 65 2018    BCR 23.5 2018    K 3.8 2018    CO2 24.0 2018    CALCIUM 8.2 (L) 2018    AST 15 2018    ALT 14 2018        CBC w/DIFF  Lab Results   Component Value Date    WBC 4.70 2018    RBC 4.53 2018    HGB 14.1 2018    HCT 43.8 2018    MCV 96.7 2018    MCH 31.1 (H) 2018    MCHC 32.2 2018    RDW 13.7 2018    RDWSD 48.8 " 12/22/2018    MPV 11.7 12/22/2018     12/22/2018    NEUTRORELPCT 59.2 12/22/2018    LYMPHORELPCT 25.3 12/22/2018    MONORELPCT 7.7 12/22/2018    EOSRELPCT 7.4 (H) 12/22/2018    BASORELPCT 0.4 12/22/2018    AUTOIGPER 0.9 (H) 12/22/2018    NEUTROABS 2.78 12/22/2018    LYMPHSABS 1.19 12/22/2018    MONOSABS 0.36 12/22/2018    EOSABS 0.35 (H) 12/22/2018    BASOSABS 0.02 12/22/2018    AUTOIGNUM 0.04 (H) 12/22/2018       Physical Exam:  Physical Exam  Vitals reviewed.   Constitutional:       Appearance: Normal appearance.   Neurological:      Mental Status: She is alert.   Psychiatric:         Mood and Affect: Mood normal.         Thought Content: Thought content normal.         Judgment: Judgment normal.          Assessment / Plan      Assessment & Plan:  Problem List Items Addressed This Visit        Other    Uncontrolled type 1 diabetes mellitus with hyperglycemia (CMS/[facility]) - Primary    Current Assessment & Plan     Download of pump shows that for the last 2 weeks she is only averaging 25 units per day. 22 units are basal and 3 units on average for boluses.  She enters 2 grams per day of carbs.    She needs more education on how to use her pump/  I will arrange for mt to contact her.  From the limited data I have, she needs a higher basal rate over night. She needs to eat more regularly and consistently and she needs adjust her carb ratio  To avoid hypoglycemia. She needs to have all her readings entered  Into the pump.     Deteriorated          Relevant Medications    insulin aspart (NovoLOG) 100 UNIT/ML injection           Triston Garcia MD   04/16/2021

## 2021-04-16 NOTE — ASSESSMENT & PLAN NOTE
Download of pump shows that for the last 2 weeks she is only averaging 25 units per day. 22 units are basal and 3 units on average for boluses.  She enters 2 grams per day of carbs.    She needs more education on how to use her pump/  I will arrange for mt to contact her.  From the limited data I have, she needs a higher basal rate over night. She needs to eat more regularly and consistently and she needs adjust her carb ratio  To avoid hypoglycemia. She needs to have all her readings entered  Into the pump.     Deteriorated

## 2021-05-06 ENCOUNTER — TELEPHONE (OUTPATIENT)
Dept: ENDOCRINOLOGY | Facility: CLINIC | Age: 31
End: 2021-05-06

## 2021-05-14 ENCOUNTER — TELEPHONE (OUTPATIENT)
Dept: ENDOCRINOLOGY | Facility: CLINIC | Age: 31
End: 2021-05-14

## 2021-07-19 ENCOUNTER — OFFICE VISIT (OUTPATIENT)
Dept: ENDOCRINOLOGY | Facility: CLINIC | Age: 31
End: 2021-07-19

## 2021-07-19 ENCOUNTER — TELEPHONE (OUTPATIENT)
Dept: ENDOCRINOLOGY | Facility: CLINIC | Age: 31
End: 2021-07-19

## 2021-07-19 VITALS
BODY MASS INDEX: 22.33 KG/M2 | HEIGHT: 70 IN | SYSTOLIC BLOOD PRESSURE: 115 MMHG | HEART RATE: 60 BPM | DIASTOLIC BLOOD PRESSURE: 80 MMHG | WEIGHT: 156 LBS | OXYGEN SATURATION: 100 %

## 2021-07-19 DIAGNOSIS — E10.65 UNCONTROLLED TYPE 1 DIABETES MELLITUS WITH HYPERGLYCEMIA (HCC): Primary | ICD-10-CM

## 2021-07-19 LAB
EXPIRATION DATE: ABNORMAL
EXPIRATION DATE: NORMAL
GLUCOSE BLDC GLUCOMTR-MCNC: 181 MG/DL (ref 70–130)
HBA1C MFR BLD: 7.7 %
Lab: ABNORMAL
Lab: NORMAL

## 2021-07-19 PROCEDURE — 83036 HEMOGLOBIN GLYCOSYLATED A1C: CPT | Performed by: INTERNAL MEDICINE

## 2021-07-19 PROCEDURE — 82947 ASSAY GLUCOSE BLOOD QUANT: CPT | Performed by: INTERNAL MEDICINE

## 2021-07-19 PROCEDURE — 99213 OFFICE O/P EST LOW 20 MIN: CPT | Performed by: INTERNAL MEDICINE

## 2021-07-19 RX ORDER — MUPIROCIN CALCIUM 20 MG/G
CREAM TOPICAL 3 TIMES DAILY
Qty: 15 G | Refills: 5 | Status: SHIPPED | OUTPATIENT
Start: 2021-07-19 | End: 2022-09-26

## 2021-07-19 NOTE — ASSESSMENT & PLAN NOTE
Diabetes control is improved.  The a1c is better. She still has very unstable blood sugars though.   We need to reduce her basal rate to allow her to take more bolus insulin to stabilize her blood sugars.

## 2021-07-19 NOTE — PROGRESS NOTES
"     Office Note      Date: 2021  Patient Name: Marcie Black  MRN: [id number]  : 1990    Chief Complaint   Patient presents with   • Diabetes       History of Present Illness:   Marcie Black is a 31 y.o. female who presents for Diabetes  Type 1.  She worked with mt to try to get it set so that all her readings would go in to her pump but that did not work so we still only have 0.6 readings her day in her pump.  She checks 4-5 times per day and has to keep it at over 200 before she goes to bed in order to avoid hypoglycemia.  And still has to run temp basal at 70% of her usual rate for 5 hours over night.  She finds that if she enters the carbs she eats and uses the carb ratio set in the pump - 1 unit per 10 grams of carbs, she gets hypoglycemia after meals.    Her basal settings are midnight- 1.1  4am  1.6  8 am 1.3  8 pm 1.05  Carb ratio is 1:10  Sensitivity is 30  Target is 100-120    Total daily insulin dose is 27.5 units per day.. basal is 26 units and bolus is 1.5 units.      Last A1c:  Hemoglobin A1C   Date Value Ref Range Status   2021 7.7 % Final       Changes in health since last visit: in the ER for ketones without acidosis.. Last eye exam up to date.    Subjective          Review of Systems:   Review of Systems   Neurological: Positive for syncope.       The following portions of the patient's history were reviewed and updated as appropriate: allergies, current medications, past family history, past medical history, past social history, past surgical history and problem list.    Objective     Visit Vitals  /80 (BP Location: Left arm, Patient Position: Sitting, Cuff Size: Adult)   Pulse 60   Ht 177.8 cm (70\")   Wt 70.8 kg (156 lb)   SpO2 100%   BMI 22.38 kg/m²       Labs:    CMP  Lab Results   Component Value Date    GLUCOSE 109 (H) 2018    BUN 24 (H) 2018    CREATININE 1.02 2018    EGFRIFNONA 65 2018    BCR 23.5 2018    K 3.8 2018    CO2 " 24.0 12/22/2018    CALCIUM 8.2 (L) 12/22/2018    AST 15 12/22/2018    ALT 14 12/22/2018        CBC w/DIFF  Lab Results   Component Value Date    WBC 4.70 12/22/2018    RBC 4.53 12/22/2018    HGB 14.1 12/22/2018    HCT 43.8 12/22/2018    MCV 96.7 12/22/2018    MCH 31.1 (H) 12/22/2018    MCHC 32.2 12/22/2018    RDW 13.7 12/22/2018    RDWSD 48.8 12/22/2018    MPV 11.7 12/22/2018     12/22/2018    NEUTRORELPCT 59.2 12/22/2018    LYMPHORELPCT 25.3 12/22/2018    MONORELPCT 7.7 12/22/2018    EOSRELPCT 7.4 (H) 12/22/2018    BASORELPCT 0.4 12/22/2018    AUTOIGPER 0.9 (H) 12/22/2018    NEUTROABS 2.78 12/22/2018    LYMPHSABS 1.19 12/22/2018    MONOSABS 0.36 12/22/2018    EOSABS 0.35 (H) 12/22/2018    BASOSABS 0.02 12/22/2018    AUTOIGNUM 0.04 (H) 12/22/2018       Physical Exam:  Physical Exam  Vitals reviewed.   Constitutional:       Appearance: Normal appearance. She is normal weight.   Cardiovascular:      Pulses:           Dorsalis pedis pulses are 2+ on the right side and 2+ on the left side.        Posterior tibial pulses are 2+ on the right side and 2+ on the left side.   Musculoskeletal:      Right foot: Normal range of motion. No deformity, bunion, Charcot foot, foot drop or prominent metatarsal heads.      Left foot: Normal range of motion. No deformity, bunion, Charcot foot, foot drop or prominent metatarsal heads.   Feet:      Right foot:      Protective Sensation: 5 sites tested. 5 sites sensed.      Skin integrity: Skin integrity normal.      Toenail Condition: Right toenails are normal.      Left foot:      Protective Sensation: 5 sites tested. 5 sites sensed.      Skin integrity: Skin integrity normal.      Toenail Condition: Left toenails are normal.      Comments: Diabetic Foot Exam Performed and Monofilament Test Performed    Neurological:      Mental Status: She is alert.          Assessment / Plan      Assessment & Plan:  Problem List Items Addressed This Visit        Other    Uncontrolled type 1  diabetes mellitus with hyperglycemia (CMS/[facility]) - Primary    Current Assessment & Plan     Diabetes control is improved.  The a1c is better. She still has very unstable blood sugars though.   We need to reduce her basal rate to allow her to take more bolus insulin to stabilize her blood sugars.         Relevant Medications    insulin aspart (NovoLOG) 100 UNIT/ML injection    Other Relevant Orders    POC Glycosylated Hemoglobin (Hb A1C) (Completed)    POC Glucose, Blood (Completed)           Triston Garcia MD   07/19/2021

## 2021-07-19 NOTE — TELEPHONE ENCOUNTER
Pharmacy called and said that the insurance would not accept the mupirocin cream, but they would accept the mupirocin ointment.     Pharmacist would like to know if she can change it.    Pharmacy:    [facility] in [place]  [phone]

## 2021-07-26 ENCOUNTER — TELEPHONE (OUTPATIENT)
Dept: ENDOCRINOLOGY | Facility: CLINIC | Age: 31
End: 2021-07-26

## 2021-07-26 NOTE — TELEPHONE ENCOUNTER
REC'D YADIRA ORDERS FROM [facility] OUT OF [place], I CALLED TO CONFIRM WITH PT. SHE ATATES SHE DID NOT ORDER THIS OR REQUEST ANYTHING FROM THIS COMPANY.

## 2021-12-19 ENCOUNTER — HOSPITAL ENCOUNTER (EMERGENCY)
Facility: HOSPITAL | Age: 31
Discharge: ANOTHER HOSPITAL | End: 2021-12-19
Attending: EMERGENCY MEDICINE | Admitting: EMERGENCY MEDICINE

## 2021-12-19 VITALS
RESPIRATION RATE: 18 BRPM | HEART RATE: 84 BPM | TEMPERATURE: 98.8 F | SYSTOLIC BLOOD PRESSURE: 134 MMHG | BODY MASS INDEX: 22.38 KG/M2 | OXYGEN SATURATION: 97 % | WEIGHT: 156 LBS | DIASTOLIC BLOOD PRESSURE: 96 MMHG

## 2021-12-19 DIAGNOSIS — F44.5 PSEUDOSEIZURE: Primary | ICD-10-CM

## 2021-12-19 LAB — GLUCOSE BLDC GLUCOMTR-MCNC: 190 MG/DL (ref 70–130)

## 2021-12-19 PROCEDURE — 36415 COLL VENOUS BLD VENIPUNCTURE: CPT

## 2021-12-19 PROCEDURE — 82962 GLUCOSE BLOOD TEST: CPT

## 2021-12-19 PROCEDURE — 99283 EMERGENCY DEPT VISIT LOW MDM: CPT

## 2021-12-20 NOTE — ED PROVIDER NOTES
Subjective   31-year-old female presents to the ED via EMS. Patient was actually in route to [facility] after being transferred from [facility]. She was being sent to  for pseudoseizures. Apparently the patient started having seizure-like activity and route. EMS gave her 4 mg of Ativan but she continued to have seizure-like activity. Apparently the patient has mostly pseudoseizures and is going to  for an EEG. On arrival to the ED the patient is having seizure-like activity but has a normal pulse ox is not and is maintaining her airway and is withdrawing from painful stimuli so she is not having true seizure activity.          Review of Systems   Neurological: Positive for seizures.   All other systems reviewed and are negative.      Past Medical History:   Diagnosis Date   • Anxiety    • Depression    • Diabetes mellitus (HCC)    • Edema    • Gastroparesis    • Headache, tension-type    • Insulin pump in place    • Migraine    • PTSD (post-traumatic stress disorder)    • Rapid palpitations    • Type 1 diabetes mellitus, uncontrolled (HCC)        Allergies   Allergen Reactions   • Dhea [Nutritional Supplements] Tinnitus     unknown   • Sulfa Antibiotics Nausea And Vomiting   • Amoxicillin Rash   • Penicillins Rash       Past Surgical History:   Procedure Laterality Date   • CHOLECYSTECTOMY     • HYMENECTOMY     • OTHER SURGICAL HISTORY      stimulator in head   • OVARIAN CYST REMOVAL         Family History   Problem Relation Age of Onset   • Hypertension Mother    • Osteoporosis Mother    • No Known Problems Father    • Hypertension Brother        Social History     Socioeconomic History   • Marital status: Single   Tobacco Use   • Smoking status: Never Smoker   • Smokeless tobacco: Never Used   Vaping Use   • Vaping Use: Never used   Substance and Sexual Activity   • Alcohol use: No   • Drug use: No   • Sexual activity: Yes           Objective   Physical Exam  Vitals and  "nursing note reviewed.   Constitutional:       General: She is not in acute distress.     Appearance: She is well-developed. She is not diaphoretic.   HENT:      Head: Normocephalic and atraumatic.      Nose: Nose normal.   Eyes:      Conjunctiva/sclera: Conjunctivae normal.   Cardiovascular:      Rate and Rhythm: Normal rate and regular rhythm.      Pulses: Normal pulses.   Pulmonary:      Effort: Pulmonary effort is normal. No respiratory distress.      Breath sounds: Normal breath sounds.   Abdominal:      General: There is no distension.      Palpations: Abdomen is soft.      Tenderness: There is no abdominal tenderness. There is no guarding.   Musculoskeletal:         General: No deformity.   Neurological:      Mental Status: She is alert and oriented to person, place, and time.      Cranial Nerves: No cranial nerve deficit.      Comments: Patient is having \"pseudoseizures\". She withdraws from painful stimuli and opens her eyes to voice         Procedures           ED Course                                                 MDM  Patient presents to the ED for questionable seizure/pseudoseizure. She is having a pseudoseizure on arrival to the ED. Does not require any more antiseizure medications as these are pseudoseizures and not real seizures. She will be transferred to [facility] where she already has a bed.    Discussed with  Dr. Flowers who accepts the patient for transfer as she has previously been accepted.  Final diagnoses:   Pseudoseizure       ED Disposition  ED Disposition     ED Disposition Condition Comment    Transfer to Another Facility             No follow-up provider specified.       Medication List      No changes were made to your prescriptions during this visit.          Govind Mann, DO  12/20/21 0019    "

## 2021-12-20 NOTE — ED NOTES
PT is stable and MD has cleared pt to be transported on to      Cristel Zaragoza RN  12/19/21 5297

## 2021-12-20 NOTE — ED NOTES
Contacted [facility] to request EMS transport to .     Pedro, April Cece  12/19/21 7167

## 2022-01-01 ENCOUNTER — DOCUMENTATION (OUTPATIENT)
Dept: ENDOCRINOLOGY | Facility: CLINIC | Age: 32
End: 2022-01-01

## 2022-01-01 NOTE — PROGRESS NOTES
Patient called reporting persistent hyperglycemia in the 400s for days since her discharge from [facility] where she has been treated for UTI.  She has been bolusing with her pump, checking glucose levels up to 8 times daily.  No signs of DKA.  Has checked her urine for ketones.  Is able to take p.o. fluids without issue.  She has changed the site and also changed the insulin in the pump without improvement in glucose levels .  Tonight she bolused 7 units of NovoLog subcu injection and glucose levels are starting to trend down.  I suspect her pump may have failed and is not appropriately delivering the insulin. She must transition to subcu injections.  Her current basal rate is set at 24.2 units total daily.  A prescription for Lantus was sent to the local pharmacy to use 24 units daily. Patient should continue using NovoLog every 3-4 hours in addition to t he once daily Lantus.  Call [employer] to see if they can troubleshoot the pump issue.  If her glucose levels continue to trend up or she starts to develop any signs or symptoms of DKA, she must go to the emergency department.  Patient expressed understanding.

## 2022-02-08 NOTE — TELEPHONE ENCOUNTER
Mother called requesting we send a refill in for patients test strips. She needs this sent to [facility] in [place].     Last ov 7/19/21  Follow up scheduled 3/24/22

## 2022-02-23 ENCOUNTER — TELEPHONE (OUTPATIENT)
Dept: ENDOCRINOLOGY | Facility: CLINIC | Age: 32
End: 2022-02-23

## 2022-03-24 ENCOUNTER — OFFICE VISIT (OUTPATIENT)
Dept: ENDOCRINOLOGY | Facility: CLINIC | Age: 32
End: 2022-03-24

## 2022-03-24 VITALS
WEIGHT: 167 LBS | BODY MASS INDEX: 23.91 KG/M2 | SYSTOLIC BLOOD PRESSURE: 116 MMHG | OXYGEN SATURATION: 97 % | DIASTOLIC BLOOD PRESSURE: 76 MMHG | HEIGHT: 70 IN | HEART RATE: 65 BPM

## 2022-03-24 DIAGNOSIS — E10.65 UNCONTROLLED TYPE 1 DIABETES MELLITUS WITH HYPERGLYCEMIA: Primary | ICD-10-CM

## 2022-03-24 LAB
EXPIRATION DATE: NORMAL
EXPIRATION DATE: NORMAL
GLUCOSE BLDC GLUCOMTR-MCNC: 111 MG/DL (ref 70–130)
HBA1C MFR BLD: 6.9 %
Lab: NORMAL
Lab: NORMAL

## 2022-03-24 PROCEDURE — 82947 ASSAY GLUCOSE BLOOD QUANT: CPT | Performed by: INTERNAL MEDICINE

## 2022-03-24 PROCEDURE — 3044F HG A1C LEVEL LT 7.0%: CPT | Performed by: INTERNAL MEDICINE

## 2022-03-24 PROCEDURE — 99213 OFFICE O/P EST LOW 20 MIN: CPT | Performed by: INTERNAL MEDICINE

## 2022-03-24 PROCEDURE — 83036 HEMOGLOBIN GLYCOSYLATED A1C: CPT | Performed by: INTERNAL MEDICINE

## 2022-03-24 RX ORDER — PROCHLORPERAZINE 25 MG/1
1 SUPPOSITORY RECTAL
Qty: 1 EACH | Refills: 0 | Status: SHIPPED | OUTPATIENT
Start: 2022-03-24 | End: 2022-05-04 | Stop reason: SDUPTHER

## 2022-03-24 RX ORDER — HYDROCODONE BITARTRATE AND ACETAMINOPHEN 10; 325 MG/1; MG/1
1 TABLET ORAL
COMMUNITY
Start: 2021-10-13 | End: 2023-03-10

## 2022-03-24 RX ORDER — PROCHLORPERAZINE 25 MG/1
SUPPOSITORY RECTAL
Qty: 9 EACH | Refills: 3 | Status: SHIPPED | OUTPATIENT
Start: 2022-03-24 | End: 2022-05-04 | Stop reason: SDUPTHER

## 2022-03-24 RX ORDER — PROCHLORPERAZINE 25 MG/1
1 SUPPOSITORY RECTAL
Qty: 1 EACH | Refills: 3 | Status: SHIPPED | OUTPATIENT
Start: 2022-03-24 | End: 2022-05-04 | Stop reason: SDUPTHER

## 2022-03-24 NOTE — PROGRESS NOTES
"     Office Note      Date: 2022  Patient Name: Marcie Black  MRN: [id number]  : 1990    Chief Complaint   Patient presents with   • Diabetes       History of Present Illness:   Marcie Black is a 31 y.o. female who presents for Diabetes- type 1  Using insulin in a [employer]tronic pump.  It has been about 9 months since I last saw her.   Since then she was hospitalized was with gastroenteritis and then a second time she had seizures from an SSRI. She says she went into DKA in the hospital.  She ended up in the ICU.  She was put on anti- epileptic medications.   Not long after that she got covid for the second time.   She feels like it is affecting  Her months later with severe fatigue.   -------------------------------------------        Last A1c:  Hemoglobin A1C   Date Value Ref Range Status   2022 6.9 % Final       Changes in health since last visit: see above . Last eye exam overdue  bg checks are done 8-10 times per day but she did not bring the one that reads with her pump.  Since covid her numbers are more unstable.  She is getting up at 4 am with hypoglycemia. If she changes her settings she ends up hyperglycemia.      .    Subjective      }        Review of Systems:   Review of Systems   Constitutional: Positive for fatigue and unexpected weight change.       The following portions of the patient's history were reviewed and updated as appropriate: allergies, current medications, past family history, past medical history, past social history, past surgical history and problem list.    Objective     Visit Vitals  /76   Pulse 65   Ht 177.8 cm (70\")   Wt 75.8 kg (167 lb)   SpO2 97%   BMI 23.96 kg/m²       Labs:    CMP  Lab Results   Component Value Date    GLUCOSE 109 (H) 2018    BUN 24 (H) 2018    CREATININE 1.02 2018    EGFRIFNONA 65 2018    BCR 23.5 2018    K 3.8 2018    CO2 24.0 2018    CALCIUM 8.2 (L) 2018    AST 15 2018    ALT 14 " 12/22/2018        CBC w/DIFF  Lab Results   Component Value Date    WBC 4.70 12/22/2018    RBC 4.53 12/22/2018    HGB 14.1 12/22/2018    HCT 43.8 12/22/2018    MCV 96.7 12/22/2018    MCH 31.1 (H) 12/22/2018    MCHC 32.2 12/22/2018    RDW 13.7 12/22/2018    RDWSD 48.8 12/22/2018    MPV 11.7 12/22/2018     12/22/2018    NEUTRORELPCT 59.2 12/22/2018    LYMPHORELPCT 25.3 12/22/2018    MONORELPCT 7.7 12/22/2018    EOSRELPCT 7.4 (H) 12/22/2018    BASORELPCT 0.4 12/22/2018    AUTOIGPER 0.9 (H) 12/22/2018    NEUTROABS 2.78 12/22/2018    LYMPHSABS 1.19 12/22/2018    MONOSABS 0.36 12/22/2018    EOSABS 0.35 (H) 12/22/2018    BASOSABS 0.02 12/22/2018    AUTOIGNUM 0.04 (H) 12/22/2018       Physical Exam:  Physical Exam  Vitals reviewed.   Constitutional:       Appearance: Normal appearance.   Neurological:      Mental Status: She is alert.   Psychiatric:         Mood and Affect: Mood normal.         Thought Content: Thought content normal.         Judgment: Judgment normal.          Assessment / Plan      Assessment & Plan:  Problem List Items Addressed This Visit        Other    Uncontrolled type 1 diabetes mellitus with hyperglycemia (HCC) - Primary    Current Assessment & Plan     a1c is at goal , but blood sugars are extremely unstable.   Will send in paperwork for dexcom            Relevant Medications    Insulin Glargine (LANTUS SOLOSTAR) 100 UNIT/ML injection pen    insulin aspart (NovoLOG) 100 UNIT/ML injection    Other Relevant Orders    POC Glucose, Blood (Completed)    POC Glycosylated Hemoglobin (Hb A1C) (Completed)           Triston Garcia MD   03/24/2022

## 2022-05-04 ENCOUNTER — TELEPHONE (OUTPATIENT)
Dept: ENDOCRINOLOGY | Facility: CLINIC | Age: 32
End: 2022-05-04

## 2022-05-04 RX ORDER — PROCHLORPERAZINE 25 MG/1
SUPPOSITORY RECTAL
Qty: 9 EACH | Refills: 3 | Status: SHIPPED | OUTPATIENT
Start: 2022-05-04

## 2022-05-04 RX ORDER — PROCHLORPERAZINE 25 MG/1
1 SUPPOSITORY RECTAL
Qty: 1 EACH | Refills: 0 | Status: SHIPPED | OUTPATIENT
Start: 2022-05-04

## 2022-05-04 RX ORDER — PROCHLORPERAZINE 25 MG/1
1 SUPPOSITORY RECTAL
Qty: 1 EACH | Refills: 3 | Status: SHIPPED | OUTPATIENT
Start: 2022-05-04

## 2022-05-04 NOTE — TELEPHONE ENCOUNTER
[employer] IN [place] STATE THAT    THEY HAVE TO HAVE THE ORIGINAL SCRIPTS FOR DEXCOM SUPPLIES ON FILE WITH THEM BEFORE MEDICARE WILL COVER THE SUPPLIES. IT SEEMS AS THOUGH THE PATIENT HAS CHANGED PHARMACIES. ONCE THEY RECEIVE A COPY OF THE ORIGINAL RX, THEY CAN FILL FOR THE PATIENT.

## 2022-05-04 NOTE — TELEPHONE ENCOUNTER
Called pt to inquire about her dexcom issue. She states that the only pharmacy in [place] that can distribute dexcom supplies is the [facility]. I asked the pt if she was able to pick them up when we sent them in march and she said she did not. Told pt I would send in her supplies to [facility]. Pt verbalized understanding.

## 2022-05-04 NOTE — TELEPHONE ENCOUNTER
Spoke with pt and let her know that she needs to contact her insurance company to see what DME company they need her to use and then give us a call back to start the process for her Dexcom supplys. Pt verbalized understanding and said she would call us back with the information.

## 2022-05-04 NOTE — TELEPHONE ENCOUNTER
Pt mother called back states dexcom g6 sensor is $1000.00 pt can not afford. I told pt mother they might have to call insurance company to see what DME supplier will cover. Please see pervious messages

## 2022-05-05 ENCOUNTER — TELEPHONE (OUTPATIENT)
Dept: ENDOCRINOLOGY | Facility: CLINIC | Age: 32
End: 2022-05-05

## 2022-05-05 NOTE — TELEPHONE ENCOUNTER
Spoke with pt's mother and she said that they are confused about who to contact about what supplier the insurance company prefers. I told pt mother to contact medicare and ask them then give us a call back to get the process going. Pt verbalized understanding and will contact clinic with any questions.

## 2022-05-05 NOTE — TELEPHONE ENCOUNTER
Called and spoke with the pt and her mother. I let them know that we are sending the order in for pt's dexcom supplies to [facility] to see if they can fill the order or send it to another DME company that can. Told pt once Dr. Moura signs the form we will fax it to them.     Pt and mother both verbalized understanding

## 2022-07-20 ENCOUNTER — TELEPHONE (OUTPATIENT)
Dept: ENDOCRINOLOGY | Facility: CLINIC | Age: 32
End: 2022-07-20

## 2022-07-20 RX ORDER — PERPHENAZINE 16 MG/1
TABLET, FILM COATED ORAL
Qty: 400 EACH | Refills: 11 | Status: SHIPPED | OUTPATIENT
Start: 2022-07-20 | End: 2022-07-21 | Stop reason: SDUPTHER

## 2022-07-20 NOTE — TELEPHONE ENCOUNTER
[facility] [phone]  We sent rx for test strips checking 6 to 8 times  They need notes documenting this  Fax number  [phone]

## 2022-07-21 NOTE — TELEPHONE ENCOUNTER
PHARMACY CALLED THIS AFTERNOON REGARDING FREQUENCY ON TESTING. THE RX NEEDS TO READ TESTING 6 TIMES PER DAY OR 8 TIMES PER DAY, PHARMACY IS STILL WORKING TO GET THIS COVERED THROUGH MEDICARE. THEY CAN DONE VERBALLY OVER THE PHONE WITH THE PHARMACY.

## 2022-09-26 ENCOUNTER — OFFICE VISIT (OUTPATIENT)
Dept: ENDOCRINOLOGY | Facility: CLINIC | Age: 32
End: 2022-09-26

## 2022-09-26 VITALS
SYSTOLIC BLOOD PRESSURE: 114 MMHG | BODY MASS INDEX: 21.19 KG/M2 | OXYGEN SATURATION: 98 % | HEIGHT: 70 IN | WEIGHT: 148 LBS | DIASTOLIC BLOOD PRESSURE: 68 MMHG | HEART RATE: 72 BPM

## 2022-09-26 DIAGNOSIS — E10.65 UNCONTROLLED TYPE 1 DIABETES MELLITUS WITH HYPERGLYCEMIA: Primary | ICD-10-CM

## 2022-09-26 LAB
EXPIRATION DATE: NORMAL
EXPIRATION DATE: NORMAL
GLUCOSE BLDC GLUCOMTR-MCNC: 99 MG/DL (ref 70–130)
HBA1C MFR BLD: 7.7 %
Lab: NORMAL
Lab: NORMAL

## 2022-09-26 PROCEDURE — 2026F EYE IMG VALID EVC RTNOPTHY: CPT | Performed by: INTERNAL MEDICINE

## 2022-09-26 PROCEDURE — 3051F HG A1C>EQUAL 7.0%<8.0%: CPT | Performed by: INTERNAL MEDICINE

## 2022-09-26 PROCEDURE — 92250 FUNDUS PHOTOGRAPHY W/I&R: CPT | Performed by: INTERNAL MEDICINE

## 2022-09-26 PROCEDURE — 83036 HEMOGLOBIN GLYCOSYLATED A1C: CPT | Performed by: INTERNAL MEDICINE

## 2022-09-26 PROCEDURE — 99213 OFFICE O/P EST LOW 20 MIN: CPT | Performed by: INTERNAL MEDICINE

## 2022-09-26 PROCEDURE — 82947 ASSAY GLUCOSE BLOOD QUANT: CPT | Performed by: INTERNAL MEDICINE

## 2022-09-26 RX ORDER — MIRTAZAPINE 30 MG/1
30 TABLET, FILM COATED ORAL
COMMUNITY
Start: 2022-09-21

## 2022-09-26 RX ORDER — MINOCYCLINE HYDROCHLORIDE 100 MG/1
CAPSULE ORAL
COMMUNITY
Start: 2022-09-06

## 2022-09-26 RX ORDER — LINACLOTIDE 72 UG/1
72 CAPSULE, GELATIN COATED ORAL DAILY
COMMUNITY
Start: 2022-07-08

## 2022-09-26 RX ORDER — VENLAFAXINE HYDROCHLORIDE 150 MG/1
150 CAPSULE, EXTENDED RELEASE ORAL DAILY
COMMUNITY
Start: 2022-09-21 | End: 2023-03-10

## 2022-09-26 RX ORDER — RISPERIDONE 0.25 MG/1
0.25 TABLET ORAL 3 TIMES DAILY
COMMUNITY
Start: 2022-09-21 | End: 2023-01-31

## 2022-09-26 RX ORDER — OMEPRAZOLE 40 MG/1
40 CAPSULE, DELAYED RELEASE ORAL 2 TIMES DAILY
COMMUNITY
Start: 2022-09-21

## 2022-09-26 NOTE — ASSESSMENT & PLAN NOTE
Diabetes is unchanged.   Continue current treatment regimen.  Diabetes will be reassessed in 3 months    We need to get her a dexcom and then look for a closed loop system for her.  Her a1c is up a little because she was off of her pump for about 2 weeks when she was in the hospital     We will do retinal photo today . IT SHOWED MILD DIABETIC RETINOPATHY. PT WILL SCHEDULE TO SEE EYE DOC

## 2022-09-26 NOTE — PROGRESS NOTES
"     Office Note      Date: 2022  Patient Name: Marcie Black  MRN: [id number]  : 1990    Chief Complaint   Patient presents with   • Diabetes     Type I       History of Present Illness:   Marcie Black is a 32 y.o. female who presents for Diabetes - type 1  Diagnosed age 12  At home her diabetes is managed with insulin in a [employer] pump.  At home - bg checks are done 6 times per day and she adjusts her insulin doses based upon the readings  She has been doing this for years  She has frequent severe low and some very high readings      Last A1c:  Hemoglobin A1C   Date Value Ref Range Status   2022 7.7 % Final   2021 7.6 (H) <5.7 % Final       Changes in health since last visit: in the hospital recently for 12 days with GI issues . Last eye exam due and advised .    Subjective            Review of Systems:   Review of Systems   Constitutional: Negative.    HENT: Negative.    Eyes: Negative.    Respiratory: Negative.        The following portions of the patient's history were reviewed and updated as appropriate: allergies, current medications, past family history, past medical history, past social history, past surgical history and problem list.    Objective     Visit Vitals  /68 (BP Location: Left arm, Patient Position: Sitting, Cuff Size: Adult)   Pulse 72   Ht 177.8 cm (70\")   Wt 67.1 kg (148 lb)   SpO2 98%   BMI 21.24 kg/m²       Labs:    CMP  Lab Results   Component Value Date    GLUCOSE 214 (H) 2021    BUN 8 2021    CREATININE 0.76 2021    EGFRIFNONA >60 2021    EGFRIFAFRI >60 2021    BCR 11 2021    K 3.7 2021    CO2 23 2021    CALCIUM 8.1 (L) 2021    AST 12 2021    ALT 11 2021        CBC w/DIFF  Lab Results   Component Value Date    WBC 6.21 2021    RBC 3.69 (L) 2021    HGB 11.3 2021    HCT 33.8 (L) 2021    MCV 92 2021    MCH 30.6 2021    MCHC 33.4 2021    RDW 13.2 " 12/21/2021    RDWSD 48.8 12/22/2018    MPV 12.7 (H) 12/21/2021     (L) 12/21/2021    NEUTRORELPCT 61.0 12/20/2021    LYMPHORELPCT 27.0 12/20/2021    MONORELPCT 7.0 12/20/2021    EOSRELPCT 3.0 12/20/2021    BASORELPCT 1.0 12/20/2021    AUTOIGPER 1.0 12/20/2021    NEUTROABS 3.74 12/20/2021    LYMPHSABS 1.68 12/20/2021    MONOSABS 0.43 12/20/2021    EOSABS 0.19 12/20/2021    BASOSABS 0.08 12/20/2021    AUTOIGNUM 0.03 12/20/2021    NRBC 0.0 12/21/2021       Physical Exam:  Physical Exam  Vitals reviewed.   Constitutional:       Appearance: Normal appearance.   Neurological:      Mental Status: She is alert.   Psychiatric:         Mood and Affect: Mood normal.         Behavior: Behavior normal.         Thought Content: Thought content normal.         Judgment: Judgment normal.          Assessment / Plan      Assessment & Plan:  Problem List Items Addressed This Visit        Other    Uncontrolled type 1 diabetes mellitus with hyperglycemia (HCC) - Primary    Current Assessment & Plan     Diabetes is unchanged.   Continue current treatment regimen.  Diabetes will be reassessed in 3 months    We need to get her a dexcom and then look for a closed loop system for her.  Her a1c is up a little because she was off of her pump for about 2 weeks when she was in the hospital     We will do retinal photo today . IT SHOWED MILD DIABETIC RETINOPATHY. PT WILL SCHEDULE TO SEE EYE DOC         Relevant Medications    Insulin Glargine (LANTUS SOLOSTAR) 100 UNIT/ML injection pen    Insulin Aspart (NovoLOG) 100 UNIT/ML injection    Other Relevant Orders    POC Glucose, Blood (Completed)    POC Glycosylated Hemoglobin (Hb A1C) (Completed)        Triston Garcia MD   09/26/2022

## 2022-09-29 RX ORDER — INSULIN ASPART 100 [IU]/ML
INJECTION, SOLUTION INTRAVENOUS; SUBCUTANEOUS
Qty: 70 ML | Refills: 3 | Status: SHIPPED | OUTPATIENT
Start: 2022-09-29 | End: 2023-01-31 | Stop reason: SDUPTHER

## 2022-12-15 ENCOUNTER — TELEPHONE (OUTPATIENT)
Dept: ENDOCRINOLOGY | Facility: CLINIC | Age: 32
End: 2022-12-15

## 2022-12-15 NOTE — TELEPHONE ENCOUNTER
Pt does not use [facility] pharmacy. Pt uses [facility] in [place], [place]. Verified pharmacy.

## 2022-12-15 NOTE — TELEPHONE ENCOUNTER
[facility] PH[facility] IS CALLING STATING THEY HAVE FAXED US A FORM ASKING IF THE PATIENT IS OUR PATIENT. THEY WOULD LIKE A CALL BACK TO DISCUSS PAPER WORK. PHONE NUMBER [phone]

## 2022-12-22 NOTE — TELEPHONE ENCOUNTER
[facility] PHARMACY IS CALLING NEEDING CHART NOTES FOR PATIENTS TEST STRIPS. MEDICARE ONLY COVERS TESTING 3 TIMES A DAY UNLESS CHART NOTE STATES PATIENT NEEDS TO TEST 10 TIMES A DAY WHICH IS THE PRESCRIPTION FOR TEST STRIPS WE SENT STATES TEST 10 TIMES. THEY ALSO NEED PUMP DOCUMENTATION IF PATIENT IS USING A PUMP. PATIENT IS OUT OF TEST STRIPS SO THEY NEEDS THIS INFORMATION ASA. THEY STATE THEY SEND US A REQUEST BACK IN July FOR THIS INFORMATION. PHARMACY PHONE NUMBER [phone] FAX NUMBER  [phone]. PATIENTS MOTHER JUST CALLED AS WELL ABOUT THIS MAR WITH TEST STRIPS AND PATIENT IS COMPLETELY OF OF TEST STRIPS.

## 2023-01-31 ENCOUNTER — OFFICE VISIT (OUTPATIENT)
Dept: ENDOCRINOLOGY | Facility: CLINIC | Age: 33
End: 2023-01-31
Payer: MEDICARE

## 2023-01-31 VITALS
DIASTOLIC BLOOD PRESSURE: 72 MMHG | HEART RATE: 77 BPM | BODY MASS INDEX: 24.48 KG/M2 | OXYGEN SATURATION: 100 % | WEIGHT: 171 LBS | HEIGHT: 70 IN | SYSTOLIC BLOOD PRESSURE: 112 MMHG

## 2023-01-31 DIAGNOSIS — E10.65 UNCONTROLLED TYPE 1 DIABETES MELLITUS WITH HYPERGLYCEMIA: Primary | ICD-10-CM

## 2023-01-31 LAB
EXPIRATION DATE: ABNORMAL
EXPIRATION DATE: NORMAL
GLUCOSE BLDC GLUCOMTR-MCNC: 246 MG/DL (ref 70–130)
HBA1C MFR BLD: 7.4 %
Lab: ABNORMAL
Lab: NORMAL

## 2023-01-31 PROCEDURE — 82947 ASSAY GLUCOSE BLOOD QUANT: CPT | Performed by: INTERNAL MEDICINE

## 2023-01-31 PROCEDURE — 99213 OFFICE O/P EST LOW 20 MIN: CPT | Performed by: INTERNAL MEDICINE

## 2023-01-31 PROCEDURE — 3051F HG A1C>EQUAL 7.0%<8.0%: CPT | Performed by: INTERNAL MEDICINE

## 2023-01-31 PROCEDURE — 83036 HEMOGLOBIN GLYCOSYLATED A1C: CPT | Performed by: INTERNAL MEDICINE

## 2023-01-31 RX ORDER — DESVENLAFAXINE SUCCINATE 50 MG/1
50 TABLET, EXTENDED RELEASE ORAL 2 TIMES DAILY
COMMUNITY

## 2023-01-31 RX ORDER — GABAPENTIN 300 MG/1
CAPSULE ORAL
COMMUNITY
Start: 2023-01-25 | End: 2023-03-10

## 2023-01-31 RX ORDER — LORATADINE 10 MG/1
TABLET ORAL
COMMUNITY
Start: 2023-01-24 | End: 2023-01-31

## 2023-01-31 RX ORDER — INSULIN ASPART 100 [IU]/ML
INJECTION, SOLUTION INTRAVENOUS; SUBCUTANEOUS
Qty: 70 ML | Refills: 3 | Status: SHIPPED | OUTPATIENT
Start: 2023-01-31

## 2023-01-31 RX ORDER — ZIPRASIDONE HYDROCHLORIDE 20 MG/1
1 CAPSULE ORAL EVERY 12 HOURS SCHEDULED
COMMUNITY
Start: 2023-01-24

## 2023-01-31 RX ORDER — BUSPIRONE HYDROCHLORIDE 10 MG/1
10 TABLET ORAL
COMMUNITY
Start: 2022-11-29

## 2023-01-31 RX ORDER — BUSPIRONE HYDROCHLORIDE 10 MG/1
1 TABLET ORAL EVERY 12 HOURS SCHEDULED
COMMUNITY
Start: 2023-01-24 | End: 2023-03-10

## 2023-01-31 NOTE — ASSESSMENT & PLAN NOTE
a1c 7.4 is improved. Blood sugars are unstable because of recent illness. She needs to get back on her dexcom and then things will settle down

## 2023-01-31 NOTE — PROGRESS NOTES
"     Office Note      Date: 2023  Patient Name: Marcie Black  MRN: [id number]  : 1990    Chief Complaint   Patient presents with   • Diabetes       History of Present Illness:   Marcie Black is a 32 y.o. female who presents for Diabetes type 1.   Current RX insulin in a [employer] pump     Bg checks are done:>6 times per day, she has a dexcom but has not been using it lately   Hypoglycemia :occasionally  She was sick with covid and with the flu . Was in the hospital for 8 days.  She has not yet gotten settled enough to resume her dexcom       Last A1c:  Hemoglobin A1C   Date Value Ref Range Status   2023 7.4 % Final   2021 7.6 (H) <5.7 % Final       Changes in health since last visit: see above . Last eye exam September .    Subjective           Review of Systems:   Review of Systems   Constitutional: Positive for unexpected weight change.   Respiratory: Positive for shortness of breath.    Gastrointestinal: Positive for diarrhea and vomiting.       The following portions of the patient's history were reviewed and updated as appropriate: allergies, current medications, past family history, past medical history, past social history, past surgical history and problem list.    Objective     Visit Vitals  /72   Pulse 77   Ht 177.8 cm (70\")   Wt 77.6 kg (171 lb)   SpO2 100%   BMI 24.54 kg/m²           Physical Exam:  Physical Exam  Vitals reviewed.   Constitutional:       Appearance: Normal appearance. She is normal weight.   Cardiovascular:      Pulses:           Dorsalis pedis pulses are 2+ on the right side and 2+ on the left side.        Posterior tibial pulses are 2+ on the right side and 2+ on the left side.   Musculoskeletal:      Right foot: Normal range of motion. No deformity, bunion, Charcot foot, foot drop or prominent metatarsal heads.      Left foot: Normal range of motion. No deformity, bunion, Charcot foot, foot drop or prominent metatarsal heads.   Feet:      Right foot:    "   Protective Sensation: 10 sites tested. 10 sites sensed.      Skin integrity: Skin integrity normal.      Toenail Condition: Right toenails are normal.      Left foot:      Protective Sensation: 10 sites tested. 10 sites sensed.      Skin integrity: Skin integrity normal.      Toenail Condition: Left toenails are normal.      Comments: Diabetic Foot Exam Performed and Monofilament Test Performed    Neurological:      Mental Status: She is alert.   Psychiatric:         Mood and Affect: Mood normal.         Behavior: Behavior normal.         Thought Content: Thought content normal.         Judgment: Judgment normal.          Assessment / Plan      Assessment & Plan:  Problem List Items Addressed This Visit        Other    Uncontrolled type 1 diabetes mellitus with hyperglycemia (HCC) - Primary    Current Assessment & Plan      a1c 7.4 is improved. Blood sugars are unstable because of recent illness. She needs to get back on her dexcom and then things will settle down          Relevant Medications    Insulin Glargine (LANTUS SOLOSTAR) 100 UNIT/ML injection pen    Insulin Aspart (NovoLOG) 100 UNIT/ML injection    Other Relevant Orders    POC Glucose, Blood (Completed)    POC Glycosylated Hemoglobin (Hb A1C) (Completed)        Triston Garcia MD   01/31/2023

## 2023-03-09 ENCOUNTER — TELEPHONE (OUTPATIENT)
Dept: ENDOCRINOLOGY | Facility: CLINIC | Age: 33
End: 2023-03-09
Payer: MEDICARE

## 2023-03-09 NOTE — TELEPHONE ENCOUNTER
Mother called stating that patient has developed a bruise on her right foot. Patient denies any falls or injuries to her foot. Patient states that the pain is in the area of middle to right side of foot up to toe line, slightly warm to touch. Patient states that she has had pain in her foot for several days. Patient reports that she has had multiple med changes recently but doesn't believe she is taking any blood thinners at this time. Patient would like to know what her course of action should be. Patient is T1DM and states that she is always very careful with her feet which is causing this concern. Please return call at [phone].

## 2023-03-09 NOTE — TELEPHONE ENCOUNTER
Options- we can try to get her  In to see me or one of the pa's to check her foot. Or she can go to the [facility].

## 2023-03-10 ENCOUNTER — OFFICE VISIT (OUTPATIENT)
Dept: ENDOCRINOLOGY | Facility: CLINIC | Age: 33
End: 2023-03-10
Payer: MEDICARE

## 2023-03-10 VITALS
OXYGEN SATURATION: 98 % | DIASTOLIC BLOOD PRESSURE: 78 MMHG | SYSTOLIC BLOOD PRESSURE: 122 MMHG | HEIGHT: 70 IN | HEART RATE: 86 BPM | WEIGHT: 175 LBS | BODY MASS INDEX: 25.05 KG/M2

## 2023-03-10 DIAGNOSIS — R23.3 EASY BRUISING: ICD-10-CM

## 2023-03-10 DIAGNOSIS — E10.65 UNCONTROLLED TYPE 1 DIABETES MELLITUS WITH HYPERGLYCEMIA: Primary | Chronic | ICD-10-CM

## 2023-03-10 DIAGNOSIS — D64.9 ANEMIA, UNSPECIFIED TYPE: ICD-10-CM

## 2023-03-10 LAB
DEPRECATED RDW RBC AUTO: 40.7 FL (ref 37–54)
ERYTHROCYTE [DISTWIDTH] IN BLOOD BY AUTOMATED COUNT: 13.2 % (ref 12.3–15.4)
HCT VFR BLD AUTO: 39.6 % (ref 34–46.6)
HGB BLD-MCNC: 12.4 G/DL (ref 12–15.9)
MCH RBC QN AUTO: 26.6 PG (ref 26.6–33)
MCHC RBC AUTO-ENTMCNC: 31.3 G/DL (ref 31.5–35.7)
MCV RBC AUTO: 85 FL (ref 79–97)
PLATELET # BLD AUTO: 172 10*3/MM3 (ref 140–450)
PMV BLD AUTO: 13 FL (ref 6–12)
RBC # BLD AUTO: 4.66 10*6/MM3 (ref 3.77–5.28)
WBC NRBC COR # BLD: 6.66 10*3/MM3 (ref 3.4–10.8)

## 2023-03-10 PROCEDURE — 84466 ASSAY OF TRANSFERRIN: CPT | Performed by: PHYSICIAN ASSISTANT

## 2023-03-10 PROCEDURE — 83540 ASSAY OF IRON: CPT | Performed by: PHYSICIAN ASSISTANT

## 2023-03-10 PROCEDURE — 85027 COMPLETE CBC AUTOMATED: CPT | Performed by: PHYSICIAN ASSISTANT

## 2023-03-10 PROCEDURE — 1160F RVW MEDS BY RX/DR IN RCRD: CPT | Performed by: PHYSICIAN ASSISTANT

## 2023-03-10 PROCEDURE — 3051F HG A1C>EQUAL 7.0%<8.0%: CPT | Performed by: PHYSICIAN ASSISTANT

## 2023-03-10 PROCEDURE — 99214 OFFICE O/P EST MOD 30 MIN: CPT | Performed by: PHYSICIAN ASSISTANT

## 2023-03-10 PROCEDURE — 1159F MED LIST DOCD IN RCRD: CPT | Performed by: PHYSICIAN ASSISTANT

## 2023-03-10 RX ORDER — GABAPENTIN 300 MG/1
300 CAPSULE ORAL EVERY MORNING
COMMUNITY

## 2023-03-10 RX ORDER — GABAPENTIN 300 MG/1
300 CAPSULE ORAL EVERY 12 HOURS SCHEDULED
COMMUNITY
End: 2023-03-10

## 2023-03-10 RX ORDER — GABAPENTIN 600 MG/1
600 TABLET ORAL NIGHTLY
COMMUNITY

## 2023-03-10 RX ORDER — LAMOTRIGINE 25 MG/1
25 TABLET ORAL EVERY EVENING
COMMUNITY
Start: 2023-02-21

## 2023-03-10 RX ORDER — MELATONIN
1000 DAILY
COMMUNITY

## 2023-03-10 RX ORDER — ASCORBIC ACID 500 MG
500 TABLET ORAL DAILY
COMMUNITY

## 2023-03-10 RX ORDER — PROMETHAZINE HYDROCHLORIDE 12.5 MG/1
12.5 TABLET ORAL AS NEEDED
COMMUNITY

## 2023-03-10 RX ORDER — ONDANSETRON 4 MG/1
4 TABLET, FILM COATED ORAL AS NEEDED
COMMUNITY

## 2023-03-10 NOTE — PROGRESS NOTES
"     Office Note      Date: 03/10/2023  Patient Name: Marcie Black  MRN: [id number]  : 1990    Chief Complaint   Patient presents with   • Diabetes   • Bruise on foot       History of Present Illness  Marcie Black is a 32 y.o. female who presents today for follow up on type 1 diabetes, bruised foot.  She remains on Medtronic pump.  She has not started Dexcom CGM yet.  She is testing blood sugars at least 6 times per day.  Occasional hypoglycemia.  No severe hypoglycemia.  She reports being admitted to hospital several times within the past ~8 months.  She reports that one time was for a medication reaction.  Another time for hematemesis and was diagnosed with ulcer.  The last admission was for Covid-19 and flu.  She reports developing bilateral leg cramps for 2 weeks.  She reports that eating bananas improved the cramps.  She reports that cramping improved about 80% of the time.  She then developed bruise top of right foot 2-3 days ago.  No known injury.  No foot pain.  She reports heavy periods.  Periods typically q2-6 months (unchanged).      Review of systems  As noted in HPI.    Past Medical History:   Diagnosis Date   • Anxiety    • Depression    • Diabetes mellitus (HCC)    • Edema    • Gastroparesis    • Headache, tension-type    • Insulin pump in place    • Migraine    • PTSD (post-traumatic stress disorder)    • Rapid palpitations    • Type 1 diabetes mellitus, uncontrolled       Past Surgical History:   Procedure Laterality Date   • CHOLECYSTECTOMY     • HYMENECTOMY     • OTHER SURGICAL HISTORY      stimulator in head   • OVARIAN CYST REMOVAL       The following portions of the patient's history were reviewed and updated as appropriate: allergies, current medications, past family history, past medical history, past social history, past surgical history and problem list.    Vitals:  /78   Pulse 86   Ht 177.8 cm (70\")   Wt 79.4 kg (175 lb)   SpO2 98%   BMI 25.11 kg/m²     Physical " Exam  Vitals reviewed.   Constitutional:       General: She is not in acute distress.  Cardiovascular:      Pulses:           Dorsalis pedis pulses are 2+ on the right side and 2+ on the left side.   Skin:     Findings: Ecchymosis (dorsal aspect of right forefoot) present.   Neurological:      Mental Status: She is alert and oriented to person, place, and time.   Psychiatric:         Mood and Affect: Affect normal.       Labs/Imaging    Hemoglobin A1c  Lab Results   Component Value Date    HGBA1C 7.4 01/31/2023    HGBA1C 7.7 09/26/2022    HGBA1C 6.9 03/24/2022     Glucose  Lab Results   Component Value Date    POCGLU 246 (A) 01/31/2023       Current Outpatient Medications   Medication Instructions   • ascorbic acid (VITAMIN C) 500 mg, Oral, Daily   • busPIRone (BUSPAR) 10 MG tablet 1 tablet. Pt reports taking 1.5 tablets BID   • cholecalciferol (VITAMIN D3) 1,000 Units, Oral, Daily   • Continuous Blood Gluc  (Dexcom G6 ) device 1 kit, Does not apply, Every 3 Months   • Continuous Blood Gluc Sensor (Dexcom G6 Sensor) Does not apply, Every 10 Days   • Continuous Blood Gluc Transmit (Dexcom G6 Transmitter) misc 1 kit, Does not apply, Every 3 Months   • desvenlafaxine (PRISTIQ) 50 mg, Oral, 2 Times Daily   • gabapentin (NEURONTIN) 300 mg, Oral, Every Morning   • gabapentin (NEURONTIN) 600 mg, Oral, Nightly   • glucose blood (Contour Next Test) test strip Use to test blood sugar 10 times daily.  Dx E10.65   • glucose blood test strip For testing 6 times per day   • glucose blood test strip Use as instructed- For use with insulin pump 4 times a day.  Dx Code E10.65   • HYDROcodone-acetaminophen (NORCO)  MG per tablet 1 tablet, Oral, As Needed, Pt get 42 monthly take prn   • Insulin Aspart (NovoLOG) 100 UNIT/ML injection Use as directed in insulin pump MDD 70 units   • Insulin Glargine (LANTUS SOLOSTAR) 24 Units, Subcutaneous, Daily   • Insulin Infusion Pump (MiniMed 770G Insulin Pump Sys) kit Does  not apply, Use as directed    • Insulin Infusion Pump device Does not apply   • Insulin Pen Needle 32G X 4 MM misc 1 each, Does not apply, Daily   • lamoTRIgine (LAMICTAL) 25 mg, Oral, Every Evening, 2 tablets in the evening   • Linzess 72 mcg, Oral, Daily, PRN ONLY    • LORATADINE ALLERGY RELIEF PO Oral   • LORazepam (ATIVAN) 2 mg, Oral, 3 Times Daily, 1 po tid   • metoprolol tartrate (LOPRESSOR) 50 mg, Oral, 2 Times Daily   • minocycline (MINOCIN,DYNACIN) 100 MG capsule TAKE 1 CAPSULE ONCE DAILY WITH FOOD   • mirtazapine (REMERON) 30 mg, Oral, Every Night at Bedtime   • omeprazole (PRILOSEC) 40 mg, Oral, 2 Times Daily   • ondansetron (ZOFRAN) 4 mg, Oral, As Needed   • ondansetron ODT (ZOFRAN-ODT) 4 mg, Oral, Every 8 Hours PRN   • promethazine (PHENERGAN) 12.5 mg, Oral, As Needed   • topiramate (TOPAMAX) 50 mg, Oral, 2 Times Daily   • ziprasidone (GEODON) 20 MG capsule 1 capsule, Oral, Every 12 Hours Scheduled       Assessment & Plan  1. Uncontrolled type 1 diabetes mellitus with hyperglycemia (HCC)  Continue Novolog in pump.  Refer for diabetes education for Dexcom CGM start.  Recommend magnesium supplement for muscle cramps.  - Ambulatory Referral to Diabetic Education    2. Easy bruising  Mild ecchymosis top of right foot, appearance of ruptured vessel.  She will continue to keep a close eye on her feet.  Advised to seek further evaluation if develops pain in foot or further changes.  Patient was examined today along with Dr. Triston Garcia.  - CBC (No Diff); Future  - Iron Profile; Future    3. Anemia, unspecified type  - CBC (No Diff); Future  - Iron Profile; Future    Will notify her of pending lab results from today.    Return in about 3 months (around 6/10/2023) for next scheduled follow up with Dr. Triston Garcia. She was advised to contact the office with any interval questions or concerns.    GLENDY Hernández  Endocrinology  03/10/2023

## 2023-03-11 LAB
IRON 24H UR-MRATE: 86 MCG/DL (ref 37–145)
IRON SATN MFR SERPL: 20 % (ref 20–50)
TIBC SERPL-MCNC: 437 MCG/DL (ref 298–536)
TRANSFERRIN SERPL-MCNC: 293 MG/DL (ref 200–360)

## 2023-06-08 ENCOUNTER — TELEPHONE (OUTPATIENT)
Dept: ENDOCRINOLOGY | Facility: CLINIC | Age: 33
End: 2023-06-08
Payer: MEDICARE

## 2023-06-08 NOTE — TELEPHONE ENCOUNTER
Howard with [facility] called states they sent over a request for office notes 6 months prior to 12/22/22  for medical necessity per Howard pt was testing 10 time daily according to last prescription sent to pharmacy on 12/22/22 was to test 10 time. Only showing [facility] requesting office note on 05/15/23 scanned in pt chart under media. [facility] fax # [phone]

## 2023-10-03 ENCOUNTER — TELEPHONE (OUTPATIENT)
Dept: ENDOCRINOLOGY | Facility: CLINIC | Age: 33
End: 2023-10-03

## 2023-10-03 NOTE — TELEPHONE ENCOUNTER
I retunred phone call at [phone]. Went to voice mail which was full.   I am not sure I can speak to her mom about her due to HIPAA.  Please contact pt in the am.   When patients have recurrent DKA they are simply not getting enough insulin, have an infection, are dehydrated or in rare instances it can be due to cannabis use.

## 2023-10-03 NOTE — TELEPHONE ENCOUNTER
Caller: KINGS RUBY     Relationship: MOTHER    Best call back number: [phone]    What is your medical concern? PT HAS BEEN VERY BAD SICK. SHE KEEPS GOING IN AND OUT OF DKA. TOO WEAK TO MOVE. IT GOT TO WHERE SHE WAS TREMORING AND WENT BLIND FOR A LITTLE BIT. WANTS A CALL BK FROM DR JULIO DIRECTLY.     How long has this issue been going on? OVER A WEEK    Is your provider already aware of this issue? NO    Have you been treated for this issue? IN HOSP NOW. NOT SURE WHAT IS GOING ON

## 2023-10-04 NOTE — TELEPHONE ENCOUNTER
Release signed in chart for mom.  Called and spoke with patients mother.  She states she was in the hospital last week with DKA.  States it happened twice while she was inpatient.  Sent home Sunday.  Woke up Monday morning and she was sicker than she had ever seen her.  She fell in the floor and was vomiting.  Her muscles and mouth were clenching, eyes dilated, couldn't speak and didn't know her mother or her name.  Was taken to [facility] via ambulance.  She is still really sick.  They are treating her with abx via IV because they think she has an infection.  Have done blood cultures but will not have results for 48 hours.  Mother is very concerned.   Records have been requested.

## 2023-10-06 NOTE — TELEPHONE ENCOUNTER
Spoke with patients mother.  Per Dr. Garcia, if unhappy with care there, they can request transfer.  Patients mother notified.  She states that she will think about it and let us know what she decides to do.

## 2023-10-06 NOTE — TELEPHONE ENCOUNTER
PATIENT'S MOTHER IS REQUESTING A RETURN CALL FROM [facility]. ADVISED MOTHER THAT RECORDS HAVE BEEN RECEIVED AND ARE AWAITING REVIEW.

## 2023-10-17 NOTE — TELEPHONE ENCOUNTER
I have no control over what the hospital does . But if she is not eating or drinking in my opinion, it would not be safe to send her home

## 2023-10-17 NOTE — TELEPHONE ENCOUNTER
PATIENT'S MOTHER CALLED TODAY REGARDING PATIENT. PATIENT IS STILL HOSPITALIZED. MOTHER REPORTS THAT PATIENT HAS NOW DEVELOPED A COUGH AND IS ON OXYGEN. AN ANGIOGRAM WAS PERFORMED YESTERDAY WHICH SHOWED MICRO VASCULAR DISEASE. WBC IS NORMAL WITH NO SIGN OF INFECTION. MOTHER STATES THAT HOSPITAL IS WANTING TO DISCHARGE PATIENT BUT PATIENT IS UNABLE TO EAT OR DRINK AT THIS TIME. MOTHER IS FEARFUL THAT HER DAUGHTER WILL NOT RECOVER.    CALL BACK [phone] MOTHER, KINGS RUBY

## 2023-11-17 RX ORDER — PERPHENAZINE 16 MG/1
TABLET, FILM COATED ORAL
Qty: 400 EACH | Refills: 11 | Status: SHIPPED | OUTPATIENT
Start: 2023-11-17

## 2023-11-17 NOTE — TELEPHONE ENCOUNTER
PATIENT IS NEEDING TEST STRIPS. PATIENT WAS RECENTLY HOSPITALIZED FOR 5 WEEKS. DKA, INFECTION. PATIENT IS R/S FOR 4/4/2024.

## 2023-11-17 NOTE — TELEPHONE ENCOUNTER
Rx Refill Note  Requested Prescriptions     Pending Prescriptions Disp Refills    glucose blood (Contour Next Test) test strip 400 each 11     Sig: Use to test blood sugar 10 times daily.  Dx E10.65      Last office visit with prescribing clinician: 6/28/2023     Next office visit with prescribing clinician: 4/4/2024       Ele Houston MA  11/17/23, 11:52 EST

## 2023-11-28 ENCOUNTER — TELEPHONE (OUTPATIENT)
Dept: ENDOCRINOLOGY | Facility: CLINIC | Age: 33
End: 2023-11-28
Payer: MEDICARE

## 2023-11-28 NOTE — TELEPHONE ENCOUNTER
Patient called stated starting in January her insurance is no longer going to cover Novolog and Humalog. They have suggested she change to Fiasp or Admelog. She would like to know which one Dr. Garcia thinks is best. Please advise.

## 2024-02-26 ENCOUNTER — TELEPHONE (OUTPATIENT)
Dept: ENDOCRINOLOGY | Facility: CLINIC | Age: 34
End: 2024-02-26
Payer: MEDICARE

## 2024-02-26 RX ORDER — PERPHENAZINE 16 MG/1
TABLET, FILM COATED ORAL
Qty: 400 EACH | Refills: 3 | Status: SHIPPED | OUTPATIENT
Start: 2024-02-26

## 2024-02-26 RX ORDER — PERPHENAZINE 16 MG/1
TABLET, FILM COATED ORAL
Qty: 400 EACH | Refills: 3 | Status: SHIPPED | OUTPATIENT
Start: 2024-02-26 | End: 2024-02-26 | Stop reason: SDUPTHER

## 2024-02-26 NOTE — TELEPHONE ENCOUNTER
Caller: Marcie Black    Relationship: Self    Best call back number: [phone]    Requested Prescriptions:   glucose blood (Contour Next Test) test strip [[id number]]       Pharmacy where request should be sent: [facility]  [place] - [address] [phone] [place] [phone]      Last office visit with prescribing clinician: 6/28/2023   Last telemedicine visit with prescribing clinician: Visit date not found   Next office visit with prescribing clinician: 4/4/2024     Additional details provided by patient: PT STATED THAT HER INSURANCE WILL ONLY PAY FOR EITHER THE TEST STRIPS OR THE MONITOR AND SHE STATED SHE NEEDED THE TEST STRIPS.    Does the patient have less than a 3 day supply:  [x] Yes  [] No    Would you like a call back once the refill request has been completed: [x] Yes [] No    If the office needs to give you a call back, can they leave a voicemail: [x] Yes [] No    Valentino Montaon Rep   02/26/24 15:42 EST

## 2024-02-28 ENCOUNTER — TELEPHONE (OUTPATIENT)
Dept: ENDOCRINOLOGY | Facility: CLINIC | Age: 34
End: 2024-02-28
Payer: MEDICARE

## 2024-02-28 NOTE — TELEPHONE ENCOUNTER
[facility] called regarding patients order for test strips to test 10 times a day. They stated that they are needing office notes documenting that patient needs to test 10 times a day as well as documentation that patient is no longer using Dexcom products.  Stated that patients insurance will not pay for both. They would like a call back, call back # [phone].

## 2024-04-04 ENCOUNTER — TELEPHONE (OUTPATIENT)
Dept: ENDOCRINOLOGY | Facility: CLINIC | Age: 34
End: 2024-04-04

## 2024-04-04 NOTE — TELEPHONE ENCOUNTER
Left a message medicare will not approve test strips or dexcom supplies due to needing an appointment every 3 mths.  We should be able to send refills at her next appt

## 2024-04-04 NOTE — TELEPHONE ENCOUNTER
Caller: Marcie Black    Relationship to patient: Self    Best call back number: [phone]    Patient is needing: MEDICARE IS REQUIRING PT TO CHOOSE BETWEEN DEXCOM 6 AND TEST STRIPS. PT PREFERS USING TEST STRIPS--PLEASE UPDATE RX WITH [facility]. PLEASE CALL TO CONFIRM WHEN UPDATED.

## 2024-05-13 ENCOUNTER — OFFICE VISIT (OUTPATIENT)
Dept: ENDOCRINOLOGY | Facility: CLINIC | Age: 34
End: 2024-05-13
Payer: MEDICARE

## 2024-05-13 VITALS
WEIGHT: 164 LBS | HEART RATE: 70 BPM | OXYGEN SATURATION: 97 % | DIASTOLIC BLOOD PRESSURE: 74 MMHG | BODY MASS INDEX: 23.48 KG/M2 | SYSTOLIC BLOOD PRESSURE: 120 MMHG | HEIGHT: 70 IN

## 2024-05-13 DIAGNOSIS — E10.65 UNCONTROLLED TYPE 1 DIABETES MELLITUS WITH HYPERGLYCEMIA: Primary | ICD-10-CM

## 2024-05-13 LAB
EXPIRATION DATE: ABNORMAL
EXPIRATION DATE: ABNORMAL
GLUCOSE BLDC GLUCOMTR-MCNC: 308 MG/DL (ref 70–130)
HBA1C MFR BLD: 7.6 % (ref 4.5–5.7)
Lab: ABNORMAL
Lab: ABNORMAL

## 2024-05-13 PROCEDURE — 3051F HG A1C>EQUAL 7.0%<8.0%: CPT | Performed by: INTERNAL MEDICINE

## 2024-05-13 PROCEDURE — 83036 HEMOGLOBIN GLYCOSYLATED A1C: CPT | Performed by: INTERNAL MEDICINE

## 2024-05-13 PROCEDURE — 1159F MED LIST DOCD IN RCRD: CPT | Performed by: INTERNAL MEDICINE

## 2024-05-13 PROCEDURE — 99214 OFFICE O/P EST MOD 30 MIN: CPT | Performed by: INTERNAL MEDICINE

## 2024-05-13 PROCEDURE — 82947 ASSAY GLUCOSE BLOOD QUANT: CPT | Performed by: INTERNAL MEDICINE

## 2024-05-13 PROCEDURE — 1160F RVW MEDS BY RX/DR IN RCRD: CPT | Performed by: INTERNAL MEDICINE

## 2024-05-13 RX ORDER — RANOLAZINE 500 MG/1
1 TABLET, EXTENDED RELEASE ORAL EVERY 12 HOURS SCHEDULED
COMMUNITY
Start: 2024-04-26

## 2024-05-13 RX ORDER — CLINDAMYCIN HYDROCHLORIDE 75 MG/1
75 CAPSULE ORAL DAILY
COMMUNITY
Start: 2024-04-03

## 2024-05-13 RX ORDER — CLONAZEPAM 0.5 MG/1
TABLET ORAL
COMMUNITY
Start: 2024-04-06

## 2024-05-13 NOTE — ASSESSMENT & PLAN NOTE
Overall A1c not bad  The plan is to continue pump therapy. They can decide if they want to upgrade to the 780 g with the guardian 4 sensor or if they want to use the ilet pump with the G7  Or if they want to stick with the current pump and do fingersticks     As she is currently having a seizure, she was taken by ambulance to the hospital

## 2024-05-13 NOTE — PROGRESS NOTES
"     Office Note      Date: 2024  Patient Name: Marcie Black  MRN: [id number]  : 1990    Chief Complaint   Patient presents with    Diabetes       History of Present Illness:   Marcie Black is a 33 y.o. female who presents for Diabetes type 1.   Current RX insulin in a medtronic pump     Bg checks are done:4-6 times per day   Hypoglycemia :rare  She is not using a dexcom. She did not find the g6 to be accurate enough for her situation according her her mother    When I walked into the room, the patient was lying on the floor having a seizure. Mother was fanning her.  Pulse ox was 98 %. Bp and pulse were normal.   Bg was 290.    Patient was put on her side and oxygen at 2 l via nasal cannula applied   911 was called     When they arrived  her sz activity abated. Then she had a second seizure. She was placed on a gurney and taken by ambulance       Last A1c:  Hemoglobin A1C   Date Value Ref Range Status   2024 7.6 (A) 4.5 - 5.7 % Final   2021 7.6 (H) <5.7 % Final       Changes in health since last visit: see above . Last eye exam up to date .    Subjective            Review of Systems:   Review of Systems   Neurological:  Positive for seizures and headaches.       The following portions of the patient's history were reviewed and updated as appropriate: allergies, current medications, past family history, past medical history, past social history, past surgical history, and problem list.    Objective     Visit Vitals  /74   Pulse 70   Ht 177.8 cm (70\")   Wt 74.4 kg (164 lb)   SpO2 97%   BMI 23.53 kg/m²           Physical Exam:  Physical Exam  Vitals reviewed.   Neurological:      Comments: Unresponsive and actively seizing           Assessment / Plan      Assessment & Plan:  Problem List Items Addressed This Visit       Uncontrolled type 1 diabetes mellitus with hyperglycemia - Primary    Current Assessment & Plan      Overall A1c not bad  The plan is to continue pump therapy. They can " decide if they want to upgrade to the 780 g with the guardian 4 sensor or if they want to use the ilet pump with the G7  Or if they want to stick with the current pump and do fingersticks     As she is currently having a seizure, she was taken by ambulance to the hospital          Relevant Medications    Insulin Glargine (LANTUS SOLOSTAR) 100 UNIT/ML injection pen    Insulin Aspart (NovoLOG) 100 UNIT/ML injection    Other Relevant Orders    POC Glucose, Blood (Completed)    POC Glycosylated Hemoglobin (Hb A1C) (Completed)         Electronically signed by : Triston Garcia MD  05/13/2024

## 2024-06-11 ENCOUNTER — TELEPHONE (OUTPATIENT)
Dept: ENDOCRINOLOGY | Facility: CLINIC | Age: 34
End: 2024-06-11
Payer: MEDICARE

## 2024-06-11 RX ORDER — INSULIN ASPART 100 [IU]/ML
INJECTION, SOLUTION INTRAVENOUS; SUBCUTANEOUS
Qty: 60 ML | Refills: 1 | Status: SHIPPED | OUTPATIENT
Start: 2024-06-11

## 2024-06-11 RX ORDER — INSULIN ASPART INJECTION 100 [IU]/ML
70 INJECTION, SOLUTION SUBCUTANEOUS DAILY
Qty: 60 ML | Refills: 2 | Status: SHIPPED | OUTPATIENT
Start: 2024-06-11

## 2024-06-11 NOTE — TELEPHONE ENCOUNTER
Rx Refill Note  Requested Prescriptions     Pending Prescriptions Disp Refills    NovoLOG 100 UNIT/ML injection [Pharmacy Med Name: NOVOLOG 100un/ML 10ML  ML'S] 60 mL 1     Sig: USE AS DIRECTED IN INSULIN PUMP MDD 70 UNITS      Last office visit with prescribing clinician: 5/13/2024     Next office visit with prescribing clinician: 6/11/2024       Bety Rendon MA  06/11/24, 12:30 EDT

## 2024-06-11 NOTE — TELEPHONE ENCOUNTER
PTS MOTHER CALLED TO SAY THAT SHE NEEDS A REFILL ON HER INSULIN-SEE PREVIOUS MESSAGE  PLEASE CALL HER BACK  [phone]

## 2024-06-11 NOTE — TELEPHONE ENCOUNTER
[facility] called, stated that patient is needing a refill on her humalog, but her insurance no longer covers. They will cover fiasp or admelog. They wanted to make us aware.

## 2024-08-07 ENCOUNTER — TELEPHONE (OUTPATIENT)
Dept: ENDOCRINOLOGY | Facility: CLINIC | Age: 34
End: 2024-08-07
Payer: MEDICARE
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
## 2024-08-07 NOTE — TELEPHONE ENCOUNTER
Can we do telemed visit with me or one of the pas so that we can get documentation for her supplies

## 2024-08-12 ENCOUNTER — TELEPHONE (OUTPATIENT)
Dept: ENDOCRINOLOGY | Facility: CLINIC | Age: 34
End: 2024-08-12
Payer: MEDICARE

## 2024-08-12 NOTE — TELEPHONE ENCOUNTER
Returned patients call. Explained at the moment we do not have any soon appointments available with Dr. Garcia for a mychart video visit. Offered her to come in as soon as next week with Ronna Cabrera PA-C. She said there is no way she can come into our office for a follow up because she has no transportation.    Explained I would call her back as soon as I could get her in soon with Dr. Garcia for a video visit.     She wanted to know if she is going to have to go back on vials if she can't get her pump supplies. Or what she needs to do. Stated she does not even have a PCP near that can help because they have retired. Would Ronna Cabrera be willing to do a Mychart video for this patient if she had a full 30 mins? Please advise.

## 2024-08-12 NOTE — TELEPHONE ENCOUNTER
Hub staff attempted to follow warm transfer process and was unsuccessful     Caller: Marcie Black    Relationship to patient: Self    Best call back number:     [phone]       Patient is needing: PT MISSED APT DUE TO AN FAMILY ISSUE . PT IS NEEDING A SOONER APT TO GET SUPPLIES. [facility]. PLEASE CALL BACK AND ADVISE.

## 2024-08-13 NOTE — TELEPHONE ENCOUNTER
I can do a video visit so she can get her pump supplies. Will they require an A1c? She may need to go to local lab for that.

## 2024-08-13 NOTE — TELEPHONE ENCOUNTER
Called patient to let her know we could get her on for a video visit with Ronna but that she would have to get her A1C drawn locally. She was upset because she just received an eviction notice and that we would just need to keep the November appointment on the scheduled.

## 2024-10-31 ENCOUNTER — TELEPHONE (OUTPATIENT)
Dept: ENDOCRINOLOGY | Facility: CLINIC | Age: 34
End: 2024-10-31

## 2024-10-31 NOTE — TELEPHONE ENCOUNTER
Caller: Marcie Black    Relationship to patient: Self    Best call back number: [phone]    Patient is needing: PT NEEDS ORDER FOR RESERVOIRS AND INFUSION SETS SENT TO [facility]. PLEASE CALL TO CONFIRM WHEN DME ORDER PLACED.

## 2024-11-13 ENCOUNTER — LAB (OUTPATIENT)
Dept: ENDOCRINOLOGY | Facility: CLINIC | Age: 34
End: 2024-11-13
Payer: MEDICARE

## 2024-11-13 ENCOUNTER — OFFICE VISIT (OUTPATIENT)
Age: 34
End: 2024-11-13
Payer: MEDICARE

## 2024-11-13 VITALS
WEIGHT: 164 LBS | DIASTOLIC BLOOD PRESSURE: 72 MMHG | HEART RATE: 68 BPM | OXYGEN SATURATION: 98 % | SYSTOLIC BLOOD PRESSURE: 116 MMHG | HEIGHT: 70 IN | BODY MASS INDEX: 23.48 KG/M2

## 2024-11-13 DIAGNOSIS — R31.0 GROSS HEMATURIA: ICD-10-CM

## 2024-11-13 DIAGNOSIS — E10.65 UNCONTROLLED TYPE 1 DIABETES MELLITUS WITH HYPERGLYCEMIA: Primary | ICD-10-CM

## 2024-11-13 PROBLEM — N02.9 IDIOPATHIC HEMATURIA: Status: RESOLVED | Noted: 2024-11-13 | Resolved: 2024-11-13

## 2024-11-13 PROBLEM — N02.9 IDIOPATHIC HEMATURIA: Status: ACTIVE | Noted: 2024-11-13

## 2024-11-13 LAB
BACTERIA UR QL AUTO: ABNORMAL /HPF
BILIRUB UR QL STRIP: NEGATIVE
CLARITY UR: CLEAR
COLOR UR: YELLOW
EXPIRATION DATE: ABNORMAL
EXPIRATION DATE: ABNORMAL
GLUCOSE BLDC GLUCOMTR-MCNC: 307 MG/DL (ref 70–130)
GLUCOSE UR STRIP-MCNC: ABNORMAL MG/DL
HBA1C MFR BLD: 8.7 % (ref 4.5–5.7)
HGB UR QL STRIP.AUTO: ABNORMAL
HYALINE CASTS UR QL AUTO: ABNORMAL /LPF
KETONES UR QL STRIP: ABNORMAL
LEUKOCYTE ESTERASE UR QL STRIP.AUTO: NEGATIVE
Lab: ABNORMAL
Lab: ABNORMAL
NITRITE UR QL STRIP: NEGATIVE
PH UR STRIP.AUTO: 6 [PH] (ref 5–8)
PROT UR QL STRIP: NEGATIVE
RBC # UR STRIP: ABNORMAL /HPF
REF LAB TEST METHOD: ABNORMAL
SP GR UR STRIP: 1.03 (ref 1–1.03)
SQUAMOUS #/AREA URNS HPF: ABNORMAL /HPF
UROBILINOGEN UR QL STRIP: ABNORMAL
WBC # UR STRIP: ABNORMAL /HPF

## 2024-11-13 PROCEDURE — 81001 URINALYSIS AUTO W/SCOPE: CPT | Performed by: INTERNAL MEDICINE

## 2024-11-13 PROCEDURE — 85027 COMPLETE CBC AUTOMATED: CPT | Performed by: INTERNAL MEDICINE

## 2024-11-13 PROCEDURE — 80053 COMPREHEN METABOLIC PANEL: CPT | Performed by: INTERNAL MEDICINE

## 2024-11-13 RX ORDER — BLOOD-GLUCOSE SENSOR
EACH MISCELLANEOUS
Qty: 2 EACH | Refills: 11 | Status: SHIPPED | OUTPATIENT
Start: 2024-11-13

## 2024-11-13 RX ORDER — NAPROXEN SODIUM 220 MG
TABLET ORAL
Qty: 100 EACH | Refills: 3 | Status: SHIPPED | OUTPATIENT
Start: 2024-11-13

## 2024-11-13 RX ORDER — INSULIN ASPART INJECTION 100 [IU]/ML
70 INJECTION, SOLUTION SUBCUTANEOUS DAILY
Qty: 60 ML | Refills: 2 | Status: SHIPPED | OUTPATIENT
Start: 2024-11-13

## 2024-11-13 NOTE — PROGRESS NOTES
Patient unable to produce blood specimen. I suggested they go to the other lab. Pt was able to produce urine sample. JANET
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
## 2024-11-13 NOTE — ASSESSMENT & PLAN NOTE
Worse / A1c is up/ bg still unstable  The plan is to go with migdalia 3plus along with the bionic pancrea.

## 2024-11-14 LAB
ALBUMIN SERPL-MCNC: 4.6 G/DL (ref 3.5–5.2)
ALBUMIN/GLOB SERPL: 1.8 G/DL
ALP SERPL-CCNC: 49 U/L (ref 39–117)
ALT SERPL W P-5'-P-CCNC: 14 U/L (ref 1–33)
ANION GAP SERPL CALCULATED.3IONS-SCNC: 13 MMOL/L (ref 5–15)
AST SERPL-CCNC: 14 U/L (ref 1–32)
BILIRUB SERPL-MCNC: 0.3 MG/DL (ref 0–1.2)
BUN SERPL-MCNC: 12 MG/DL (ref 6–20)
BUN/CREAT SERPL: 14.1 (ref 7–25)
CALCIUM SPEC-SCNC: 9.5 MG/DL (ref 8.6–10.5)
CHLORIDE SERPL-SCNC: 99 MMOL/L (ref 98–107)
CO2 SERPL-SCNC: 23 MMOL/L (ref 22–29)
CREAT SERPL-MCNC: 0.85 MG/DL (ref 0.57–1)
DEPRECATED RDW RBC AUTO: 42.6 FL (ref 37–54)
EGFRCR SERPLBLD CKD-EPI 2021: 92.3 ML/MIN/1.73
ERYTHROCYTE [DISTWIDTH] IN BLOOD BY AUTOMATED COUNT: 12.5 % (ref 12.3–15.4)
GLOBULIN UR ELPH-MCNC: 2.6 GM/DL
GLUCOSE SERPL-MCNC: 374 MG/DL (ref 65–99)
HCT VFR BLD AUTO: 43.8 % (ref 34–46.6)
HGB BLD-MCNC: 14.1 G/DL (ref 12–15.9)
HOLD SPECIMEN: NORMAL
MCH RBC QN AUTO: 30.2 PG (ref 26.6–33)
MCHC RBC AUTO-ENTMCNC: 32.2 G/DL (ref 31.5–35.7)
MCV RBC AUTO: 93.8 FL (ref 79–97)
PLATELET # BLD AUTO: 185 10*3/MM3 (ref 140–450)
PMV BLD AUTO: 13.1 FL (ref 6–12)
POTASSIUM SERPL-SCNC: 5 MMOL/L (ref 3.5–5.2)
PROT SERPL-MCNC: 7.2 G/DL (ref 6–8.5)
RBC # BLD AUTO: 4.67 10*6/MM3 (ref 3.77–5.28)
SODIUM SERPL-SCNC: 135 MMOL/L (ref 136–145)
WBC NRBC COR # BLD AUTO: 9.7 10*3/MM3 (ref 3.4–10.8)

## 2024-11-15 ENCOUNTER — DOCUMENTATION (OUTPATIENT)
Dept: ENDOCRINOLOGY | Facility: CLINIC | Age: 34
End: 2024-11-15
Payer: MEDICARE

## 2025-02-12 ENCOUNTER — PRIOR AUTHORIZATION (OUTPATIENT)
Dept: ENDOCRINOLOGY | Facility: CLINIC | Age: 35
End: 2025-02-12
Payer: MEDICARE

## 2025-02-12 NOTE — TELEPHONE ENCOUNTER
JUDITH RUBY (Key: [id number])  Rx #: [id number]  Fiasp 100UNIT/ML solution  Form  [employer] Electronic PA Form (2017 NCPDP)  Created  14 hours ago  Sent to Plan  5 minutes ago  Plan Response  4 minutes ago  Submit Clinical Questions  4 minutes ago  Determination  Favorable  1 minute ago  Message from Plan  Your request has been approved. Authorization Expiration Date: February 12, 2026.

## 2025-06-16 ENCOUNTER — TELEPHONE (OUTPATIENT)
Dept: ENDOCRINOLOGY | Facility: CLINIC | Age: 35
End: 2025-06-16

## 2025-06-16 NOTE — TELEPHONE ENCOUNTER
PT CALLED STATING SHE IS OUT OF MINIMED 670G QUICK SETS AND RESERVOIRS. SHE STATED SHE GETS THEM FROM [facility]. SHE REQUESTED A CALL BACK.

## 2025-06-17 NOTE — TELEPHONE ENCOUNTER
Spoke to patient and she stated that she already talk to [facility] and they are sending out her pump supplies.

## 2025-08-11 ENCOUNTER — TELEPHONE (OUTPATIENT)
Dept: ENDOCRINOLOGY | Facility: CLINIC | Age: 35
End: 2025-08-11
Payer: MEDICARE

## 2025-08-14 ENCOUNTER — OFFICE VISIT (OUTPATIENT)
Age: 35
End: 2025-08-14
Payer: MEDICARE

## 2025-08-14 VITALS
BODY MASS INDEX: 20.02 KG/M2 | DIASTOLIC BLOOD PRESSURE: 76 MMHG | SYSTOLIC BLOOD PRESSURE: 116 MMHG | HEART RATE: 71 BPM | OXYGEN SATURATION: 98 % | WEIGHT: 139.5 LBS

## 2025-08-14 DIAGNOSIS — E10.65 UNCONTROLLED TYPE 1 DIABETES MELLITUS WITH HYPERGLYCEMIA: Primary | ICD-10-CM

## 2025-08-14 LAB
EXPIRATION DATE: ABNORMAL
EXPIRATION DATE: ABNORMAL
GLUCOSE BLDC GLUCOMTR-MCNC: 233 MG/DL (ref 70–130)
HBA1C MFR BLD: 8.5 % (ref 4.5–5.7)
Lab: ABNORMAL
Lab: ABNORMAL

## 2025-08-14 PROCEDURE — 82570 ASSAY OF URINE CREATININE: CPT | Performed by: PHYSICIAN ASSISTANT

## 2025-08-14 PROCEDURE — 82043 UR ALBUMIN QUANTITATIVE: CPT | Performed by: PHYSICIAN ASSISTANT

## 2025-08-14 RX ORDER — ALBUTEROL SULFATE 90 UG/1
1 INHALANT RESPIRATORY (INHALATION)
COMMUNITY

## 2025-08-14 RX ORDER — FEXOFENADINE HCL 180 MG/1
180 TABLET ORAL DAILY
COMMUNITY

## 2025-08-14 RX ORDER — BUDESONIDE AND FORMOTEROL FUMARATE DIHYDRATE 160; 4.5 UG/1; UG/1
2 AEROSOL RESPIRATORY (INHALATION) 2 TIMES DAILY
COMMUNITY
Start: 2025-07-28

## 2025-08-15 LAB
ALBUMIN UR-MCNC: <1.2 MG/DL
CREAT UR-MCNC: 53.3 MG/DL
MICROALBUMIN/CREAT UR: NORMAL MG/G{CREAT}